# Patient Record
Sex: FEMALE | Race: OTHER | Employment: UNEMPLOYED | URBAN - METROPOLITAN AREA
[De-identification: names, ages, dates, MRNs, and addresses within clinical notes are randomized per-mention and may not be internally consistent; named-entity substitution may affect disease eponyms.]

---

## 2020-04-30 LAB
ANTIBODY SCREEN, EXTERNAL: NEGATIVE
CHLAMYDIA, EXTERNAL: NORMAL
HBSAG, EXTERNAL: NEGATIVE
HIV, EXTERNAL: NEGATIVE
N. GONORRHEA, EXTERNAL: NORMAL
RPR, EXTERNAL: NORMAL
RUBELLA, EXTERNAL: NORMAL
TYPE, ABO & RH, EXTERNAL: NORMAL

## 2020-10-16 LAB — GRBS, EXTERNAL: POSITIVE

## 2020-11-14 ENCOUNTER — HOSPITAL ENCOUNTER (INPATIENT)
Age: 20
LOS: 3 days | Discharge: HOME | DRG: 540 | End: 2020-11-18
Attending: OBSTETRICS & GYNECOLOGY | Admitting: OBSTETRICS & GYNECOLOGY
Payer: MEDICAID

## 2020-11-14 DIAGNOSIS — Z33.1 IUP (INTRAUTERINE PREGNANCY), INCIDENTAL: Primary | ICD-10-CM

## 2020-11-14 PROBLEM — O48.0 41 WEEKS GESTATION OF PREGNANCY: Status: ACTIVE | Noted: 2020-11-14

## 2020-11-14 PROBLEM — F40.01 AGORAPHOBIA WITH PANIC DISORDER: Status: ACTIVE | Noted: 2020-11-14

## 2020-11-14 PROBLEM — Z3A.41 41 WEEKS GESTATION OF PREGNANCY: Status: ACTIVE | Noted: 2020-11-14

## 2020-11-14 PROBLEM — O09.33 LIMITED PRENATAL CARE, THIRD TRIMESTER: Status: ACTIVE | Noted: 2020-11-14

## 2020-11-14 LAB
ABO + RH BLD: NORMAL
APPEARANCE UR: NORMAL
BASOPHILS # BLD: 0 K/UL (ref 0–0.1)
BASOPHILS NFR BLD: 0 % (ref 0–2)
BILIRUB UR QL: NEGATIVE
BLOOD GROUP ANTIBODIES SERPL: NORMAL
COLOR UR: YELLOW
DIFFERENTIAL METHOD BLD: ABNORMAL
EOSINOPHIL # BLD: 0 K/UL (ref 0–0.4)
EOSINOPHIL NFR BLD: 0 % (ref 0–5)
ERYTHROCYTE [DISTWIDTH] IN BLOOD BY AUTOMATED COUNT: 13.8 % (ref 11.6–14.5)
GLUCOSE UR QL STRIP.AUTO: NEGATIVE MG/DL
HCT VFR BLD AUTO: 39.7 % (ref 35–45)
HGB BLD-MCNC: 13.5 G/DL (ref 12–16)
KETONES UR-MCNC: NEGATIVE MG/DL
LEUKOCYTE ESTERASE UR QL STRIP: NEGATIVE
LYMPHOCYTES # BLD: 1.6 K/UL (ref 0.9–3.6)
LYMPHOCYTES NFR BLD: 20 % (ref 21–52)
MCH RBC QN AUTO: 32.1 PG (ref 24–34)
MCHC RBC AUTO-ENTMCNC: 34 G/DL (ref 31–37)
MCV RBC AUTO: 94.3 FL (ref 74–97)
MONOCYTES # BLD: 0.5 K/UL (ref 0.05–1.2)
MONOCYTES NFR BLD: 6 % (ref 3–10)
NEUTS SEG # BLD: 6.1 K/UL (ref 1.8–8)
NEUTS SEG NFR BLD: 74 % (ref 40–73)
NITRITE UR QL: NEGATIVE
PH UR: 7 [PH] (ref 5–9)
PLATELET # BLD AUTO: 153 K/UL (ref 135–420)
PMV BLD AUTO: 11.4 FL (ref 9.2–11.8)
PROT UR QL: NEGATIVE MG/DL
RBC # BLD AUTO: 4.21 M/UL (ref 4.2–5.3)
RBC # UR STRIP: NEGATIVE /UL
SERVICE CMNT-IMP: NORMAL
SP GR UR: 1.01 (ref 1–1.02)
SPECIMEN EXP DATE BLD: NORMAL
UROBILINOGEN UR QL: 0.2 EU/DL (ref 0.2–1)
WBC # BLD AUTO: 8.3 K/UL (ref 4.6–13.2)

## 2020-11-14 PROCEDURE — 74011250637 HC RX REV CODE- 250/637: Performed by: OBSTETRICS & GYNECOLOGY

## 2020-11-14 PROCEDURE — 76815 OB US LIMITED FETUS(S): CPT

## 2020-11-14 PROCEDURE — 85025 COMPLETE CBC W/AUTO DIFF WBC: CPT

## 2020-11-14 PROCEDURE — 81003 URINALYSIS AUTO W/O SCOPE: CPT

## 2020-11-14 PROCEDURE — 86900 BLOOD TYPING SEROLOGIC ABO: CPT

## 2020-11-14 PROCEDURE — 74011250636 HC RX REV CODE- 250/636: Performed by: OBSTETRICS & GYNECOLOGY

## 2020-11-14 PROCEDURE — 99283 EMERGENCY DEPT VISIT LOW MDM: CPT

## 2020-11-14 PROCEDURE — 59025 FETAL NON-STRESS TEST: CPT

## 2020-11-14 PROCEDURE — 74011000258 HC RX REV CODE- 258: Performed by: OBSTETRICS & GYNECOLOGY

## 2020-11-14 PROCEDURE — 74011000250 HC RX REV CODE- 250: Performed by: OBSTETRICS & GYNECOLOGY

## 2020-11-14 PROCEDURE — 65270000029 HC RM PRIVATE

## 2020-11-14 PROCEDURE — 75410000002 HC LABOR FEE PER 1 HR

## 2020-11-14 RX ORDER — MINERAL OIL
30 OIL (ML) ORAL AS NEEDED
Status: DISCONTINUED | OUTPATIENT
Start: 2020-11-14 | End: 2020-11-16

## 2020-11-14 RX ORDER — CLONAZEPAM 0.5 MG/1
0.5 TABLET ORAL DAILY
Status: DISCONTINUED | OUTPATIENT
Start: 2020-11-15 | End: 2020-11-18 | Stop reason: HOSPADM

## 2020-11-14 RX ORDER — MIRTAZAPINE 15 MG/1
45 TABLET, FILM COATED ORAL
Status: DISCONTINUED | OUTPATIENT
Start: 2020-11-14 | End: 2020-11-18 | Stop reason: HOSPADM

## 2020-11-14 RX ORDER — MIRTAZAPINE 45 MG/1
45 TABLET, FILM COATED ORAL
COMMUNITY

## 2020-11-14 RX ORDER — NALBUPHINE HYDROCHLORIDE 10 MG/ML
10 INJECTION, SOLUTION INTRAMUSCULAR; INTRAVENOUS; SUBCUTANEOUS
Status: DISCONTINUED | OUTPATIENT
Start: 2020-11-14 | End: 2020-11-16

## 2020-11-14 RX ORDER — OXYTOCIN/RINGER'S LACTATE 30/500 ML
10 PLASTIC BAG, INJECTION (ML) INTRAVENOUS AS NEEDED
Status: DISCONTINUED | OUTPATIENT
Start: 2020-11-14 | End: 2020-11-16 | Stop reason: HOSPADM

## 2020-11-14 RX ORDER — SODIUM CHLORIDE, SODIUM LACTATE, POTASSIUM CHLORIDE, CALCIUM CHLORIDE 600; 310; 30; 20 MG/100ML; MG/100ML; MG/100ML; MG/100ML
125 INJECTION, SOLUTION INTRAVENOUS CONTINUOUS
Status: DISCONTINUED | OUTPATIENT
Start: 2020-11-14 | End: 2020-11-16

## 2020-11-14 RX ORDER — TERBUTALINE SULFATE 1 MG/ML
0.25 INJECTION SUBCUTANEOUS
Status: DISCONTINUED | OUTPATIENT
Start: 2020-11-14 | End: 2020-11-16

## 2020-11-14 RX ORDER — MISOPROSTOL 100 UG/1
25 TABLET ORAL EVERY 6 HOURS
Status: DISCONTINUED | OUTPATIENT
Start: 2020-11-14 | End: 2020-11-15

## 2020-11-14 RX ORDER — OXYTOCIN/0.9 % SODIUM CHLORIDE 30/500 ML
95 PLASTIC BAG, INJECTION (ML) INTRAVENOUS AS NEEDED
Status: COMPLETED | OUTPATIENT
Start: 2020-11-14 | End: 2020-11-16

## 2020-11-14 RX ORDER — CLONAZEPAM 0.5 MG/1
0.5 TABLET ORAL
Status: COMPLETED | OUTPATIENT
Start: 2020-11-14 | End: 2020-11-14

## 2020-11-14 RX ORDER — BUTORPHANOL TARTRATE 2 MG/ML
2 INJECTION INTRAMUSCULAR; INTRAVENOUS
Status: DISCONTINUED | OUTPATIENT
Start: 2020-11-14 | End: 2020-11-16

## 2020-11-14 RX ORDER — METHYLERGONOVINE MALEATE 0.2 MG/ML
0.2 INJECTION INTRAVENOUS AS NEEDED
Status: DISCONTINUED | OUTPATIENT
Start: 2020-11-14 | End: 2020-11-16 | Stop reason: HOSPADM

## 2020-11-14 RX ORDER — HYDROMORPHONE HYDROCHLORIDE 1 MG/ML
1 INJECTION, SOLUTION INTRAMUSCULAR; INTRAVENOUS; SUBCUTANEOUS
Status: DISCONTINUED | OUTPATIENT
Start: 2020-11-14 | End: 2020-11-16

## 2020-11-14 RX ORDER — QUETIAPINE FUMARATE 100 MG/1
100 TABLET, FILM COATED ORAL
Status: DISCONTINUED | OUTPATIENT
Start: 2020-11-14 | End: 2020-11-18 | Stop reason: HOSPADM

## 2020-11-14 RX ORDER — LIDOCAINE HYDROCHLORIDE 10 MG/ML
20 INJECTION, SOLUTION EPIDURAL; INFILTRATION; INTRACAUDAL; PERINEURAL AS NEEDED
Status: DISCONTINUED | OUTPATIENT
Start: 2020-11-14 | End: 2020-11-16

## 2020-11-14 RX ADMIN — SODIUM CHLORIDE 2.5 MILLION UNITS: 9 INJECTION, SOLUTION INTRAVENOUS at 23:28

## 2020-11-14 RX ADMIN — QUETIAPINE FUMARATE 100 MG: 100 TABLET ORAL at 21:35

## 2020-11-14 RX ADMIN — MISOPROSTOL 25 MCG: 100 TABLET ORAL at 18:09

## 2020-11-14 RX ADMIN — SODIUM CHLORIDE 5 MILLION UNITS: 900 INJECTION INTRAVENOUS at 18:08

## 2020-11-14 RX ADMIN — MIRTAZAPINE 45 MG: 15 TABLET, FILM COATED ORAL at 21:34

## 2020-11-14 RX ADMIN — CLONAZEPAM 0.5 MG: 0.5 TABLET ORAL at 23:09

## 2020-11-14 RX ADMIN — BUTORPHANOL TARTRATE 1 MG: 2 INJECTION, SOLUTION INTRAMUSCULAR; INTRAVENOUS at 23:19

## 2020-11-14 NOTE — PROGRESS NOTES
Dr Kieran Harry has been called and informed cvx is FT/thick/posterior, unable to tell position on baby. Also informed physician pt is GBS positive and has no allergies,GC/Cl are unknown. Dr Summer Haq says he will come in to perform a bedside ultrasound.

## 2020-11-14 NOTE — PROGRESS NOTES
Hortensia Alfred RN has spoken via phone with DR ISAIAS Nicole that this pt is here, hx agoraphobia, anxiety, is here at 41.1 wk gestation, came here for possible IOL. Has received prenatal care from a lay midwife. Orders received to check cervix, then call DR Amina Owen .

## 2020-11-14 NOTE — ROUTINE PROCESS
1415:   41+1 weeks gestation presented to unit via wheelchair via medic. Patient states that she has been seen by a home birth lay midwife, however, a couple weeks ago she discharged her from her care. Patient states she planned to establish care with [facility] house as recommended by her lay midwife, however, she has not yet been seen by them. Patient denies any pain or issues causing her to present today, however, states that she was told by the lay midwife that because she is over 41 weeks gestation, takes certain medications, and had protein in her urine a few weeks ago, she should be induced. Patient taken to [facility], oriented to room and call bell. Provided therapeutic listening and reassurance as patient reports extreme anxiety and fear of panic attack due to agoraphobia. Per lay midwife records, lay midwife would provide initial prenatal care, however, lay midwife recommended patient establish care with an OB/Gyn at approximately 20 weeks gestation as she was not a candidate for a home birth due to medications she is taking as prescribed by her psychiatrist and possible  withdraw after delivery. Patient states she has not been seen or established care with an OB practice at this time, but has called [facility] and was told that she would be seen by the on call provider. Patient states she has never had an ultrasound during her pregnancy. 1525:  Telephone SBAR report given to Dr. Anh Montoya including patient's history, presenting with no complaints, but requesting induction of labor due to concern regarding post due date. Order received to check cervical dilation and then discuss with patient options for continuing care with him and his practice and beginning an induction today or discharge home and scheduling an induction with an OB practice of her choice in the next couple days.   Discussed this with patient and patient's family, patient verbalizes understanding and states that she would like to obtain care from Dr. Abdulkadir Antonio and his OB practice and would like him to begin induction of labor today. 1530:  Bedside and Verbal shift change report given to Krissy Hill RN (oncoming nurse) by Jere Murphy RN 
 (offgoing nurse). Report included the following information SBAR, MAR and Recent Results. Alarm parameters reviewed and opportunities for questions provided.

## 2020-11-14 NOTE — H&P
History & Physical    Name: Anastasia Rodriguez MRN: [id number]  SSN: [id number]    YOB: 2000  Age: 23 y.o. Sex: female      Subjective:     Estimated Date of Delivery: 20  OB History    Para Term  AB Living   1             SAB TAB Ectopic Molar Multiple Live Births                    # Outcome Date GA Lbr Aram/2nd Weight Sex Delivery Anes PTL Lv   1 Current                Ms. Venu Doherty is admitted with pregnancy at 41w1d for induction of labor due to post dates. Prenatal course was complicated by no obstetrical ultrasound, mulitple psych medications including benzodiazepine (clonazepam). Pt reports severe anxiety and agoraphobia. She is also GBS+. .  Please see prenatal records for details. Past Medical History:   Diagnosis Date    Anxiety     Panic     Psychiatric problem     agoraphobia with panic disorder     No past surgical history on file. Social History     Occupational History    Not on file   Tobacco Use    Smoking status: Never Smoker    Smokeless tobacco: Never Used   Substance and Sexual Activity    Alcohol use: Not Currently    Drug use: Never    Sexual activity: Yes     Partners: Male     No family history on file. No Known Allergies  Prior to Admission medications    Medication Sig Start Date End Date Taking? Authorizing Provider   mirtazapine (REMERON) 45 mg tablet Take 45 mg by mouth nightly. Indications: major depressive disorder, depression and anxiety   Yes Provider, Historical   QUEtiapine (SEROQUEL) 50 mg tablet Take 100 mg by mouth nightly. Indications: repeated episodes of anxiety, agoraphobia with panic disorder    OtherSerafin MD   clonazePAM (KLONOPIN) 0.5 mg tablet Take 0.5 mg by mouth nightly as needed. Serafin Sahni MD   citalopram (CELEXA) 10 mg tablet Take 10 mg by mouth daily.     Serafin Sahni MD        Review of Systems: A comprehensive review of systems was negative except for that written in the History of Present Illness. Objective:     Vitals:  Vitals:    11/14/20 1445 11/14/20 1500 11/14/20 1545 11/14/20 1600   BP: 130/79 135/76 139/88 136/83   Pulse: (!) 108 95 (!) 102 91   Resp:  18     Temp:  98.2 °F (36.8 °C)     SpO2:       Weight:  68 kg (150 lb)     Height:  5' 4\" (1.626 m)          Physical Exam:  Patient without distress. Abdomen: soft, nontender  Fundus: soft and non tender  Perineum: blood absent, amniotic fluid absent  Cervical Exam: 1(FT)/(thick)/(posterior)/   Lower Extremities:  - Edema No   - No evidence of DVT seen on physical exam.   - Patellar Reflexes: 1+ bilaterally   - Clonus: absent  Membranes:  Intact  Fetal Heart Rate: Reactive  Baseline: 140 per minute  Variability: moderate  Accelerations: yes  Decelerations: none  Uterine contractions: irregular, every 5-10 minutes          Bedside ultrasound: Viable soni intrauterine pregnancy, vertex presentation, posterior fundal placenta. Adequate appearing amniotic fluid. Prenatal Labs:   Lab Results   Component Value Date/Time    Rubella, External NON immune 04/30/2020    HBsAg, External negative 04/30/2020    HIV, External negative 04/30/2020    RPR, External non-reactive 04/30/2020    Gonorrhea, External no result / no collected 04/30/2020    Chlamydia, External no result / not collected 04/30/2020    ABO,Rh A positive 04/30/2020    GrBStrep, External positive 10/16/2020       Impression/Plan:     Active Problems:    Agoraphobia with panic disorder (11/14/2020)      IUP (intrauterine pregnancy), incidental (11/14/2020)      41 weeks gestation of pregnancy (11/14/2020)      Limited prenatal care, third trimester (11/14/2020)     Benzodiazepine, anti-depressant and antipsychotic use throughout pregnancy    Plan: Admit for induction of labor. Group B Strep positive, will treat prophylactically with penicillin. Will continue her psych meds to avoid withdrawal symptoms as patient reports taking Clonazepam daily and \"jitters/withdrawal\" if she does not. Will notify pediatrics of patient's medication list. Patient advised that bedside ultrasound at this point is simply to confirm vertex position and placental location and not for anatomic survey due to late gestation and equipment available. Plan of care (cervical ripening overnight followed by pitocin when favorable) reviewed with patient and family and all questions answered.

## 2020-11-15 ENCOUNTER — ANESTHESIA EVENT (OUTPATIENT)
Dept: LABOR AND DELIVERY | Age: 20
DRG: 540 | End: 2020-11-15
Payer: MEDICAID

## 2020-11-15 ENCOUNTER — ANESTHESIA (OUTPATIENT)
Dept: LABOR AND DELIVERY | Age: 20
DRG: 540 | End: 2020-11-15
Payer: MEDICAID

## 2020-11-15 PROBLEM — F41.9 ANXIETY DISORDER AFFECTING PREGNANCY, ANTEPARTUM: Status: ACTIVE | Noted: 2020-11-15

## 2020-11-15 PROBLEM — O09.33 LIMITED PRENATAL CARE IN THIRD TRIMESTER: Status: ACTIVE | Noted: 2020-11-15

## 2020-11-15 PROBLEM — F40.01 AGORAPHOBIA WITH PANIC ATTACKS: Status: ACTIVE | Noted: 2020-11-15

## 2020-11-15 PROBLEM — O48.0 POST-DATES PREGNANCY: Status: ACTIVE | Noted: 2020-11-15

## 2020-11-15 PROBLEM — O99.340 ANXIETY DISORDER AFFECTING PREGNANCY, ANTEPARTUM: Status: ACTIVE | Noted: 2020-11-15

## 2020-11-15 PROCEDURE — 77030006643: Performed by: ANESTHESIOLOGY

## 2020-11-15 PROCEDURE — 4A1HXCZ MONITORING OF PRODUCTS OF CONCEPTION, CARDIAC RATE, EXTERNAL APPROACH: ICD-10-PCS | Performed by: OBSTETRICS & GYNECOLOGY

## 2020-11-15 PROCEDURE — 76060000078 HC EPIDURAL ANESTHESIA

## 2020-11-15 PROCEDURE — 74011250636 HC RX REV CODE- 250/636: Performed by: ANESTHESIOLOGY

## 2020-11-15 PROCEDURE — 74011250636 HC RX REV CODE- 250/636: Performed by: OBSTETRICS & GYNECOLOGY

## 2020-11-15 PROCEDURE — 77030008477 HC STYL SATN SLP COVD -A: Performed by: ANESTHESIOLOGY

## 2020-11-15 PROCEDURE — 3E0P7VZ INTRODUCTION OF HORMONE INTO FEMALE REPRODUCTIVE, VIA NATURAL OR ARTIFICIAL OPENING: ICD-10-PCS | Performed by: OBSTETRICS & GYNECOLOGY

## 2020-11-15 PROCEDURE — 77030007879 HC KT SPN EPDRL TELE -B: Performed by: ANESTHESIOLOGY

## 2020-11-15 PROCEDURE — 74011000250 HC RX REV CODE- 250

## 2020-11-15 PROCEDURE — 74011000250 HC RX REV CODE- 250: Performed by: OBSTETRICS & GYNECOLOGY

## 2020-11-15 PROCEDURE — 3E033VJ INTRODUCTION OF OTHER HORMONE INTO PERIPHERAL VEIN, PERCUTANEOUS APPROACH: ICD-10-PCS | Performed by: OBSTETRICS & GYNECOLOGY

## 2020-11-15 PROCEDURE — 77030019905 HC CATH URETH INTMIT MDII -A

## 2020-11-15 PROCEDURE — 74011000258 HC RX REV CODE- 258: Performed by: OBSTETRICS & GYNECOLOGY

## 2020-11-15 PROCEDURE — 74011000250 HC RX REV CODE- 250: Performed by: ANESTHESIOLOGY

## 2020-11-15 PROCEDURE — 77030040830 HC CATH URETH FOL MDII -A

## 2020-11-15 PROCEDURE — 75410000002 HC LABOR FEE PER 1 HR

## 2020-11-15 PROCEDURE — 65270000029 HC RM PRIVATE

## 2020-11-15 PROCEDURE — 77030008683 HC TU ET CUF COVD -A: Performed by: ANESTHESIOLOGY

## 2020-11-15 PROCEDURE — 74011250637 HC RX REV CODE- 250/637: Performed by: OBSTETRICS & GYNECOLOGY

## 2020-11-15 RX ORDER — FENTANYL/ROPIVACAINE/NS/PF 2MCG/ML-.1
PLASTIC BAG, INJECTION (ML) EPIDURAL
Status: COMPLETED
Start: 2020-11-15 | End: 2020-11-15

## 2020-11-15 RX ORDER — FENTANYL CITRATE 50 UG/ML
100 INJECTION, SOLUTION INTRAMUSCULAR; INTRAVENOUS ONCE
Status: DISPENSED | OUTPATIENT
Start: 2020-11-15 | End: 2020-11-15

## 2020-11-15 RX ORDER — FENTANYL CITRATE 50 UG/ML
INJECTION, SOLUTION INTRAMUSCULAR; INTRAVENOUS
Status: COMPLETED
Start: 2020-11-15 | End: 2020-11-15

## 2020-11-15 RX ORDER — SODIUM CHLORIDE 0.9 % (FLUSH) 0.9 %
5-40 SYRINGE (ML) INJECTION AS NEEDED
Status: DISCONTINUED | OUTPATIENT
Start: 2020-11-15 | End: 2020-11-16

## 2020-11-15 RX ORDER — LIDOCAINE HYDROCHLORIDE AND EPINEPHRINE 15; 5 MG/ML; UG/ML
INJECTION, SOLUTION EPIDURAL
Status: SHIPPED | OUTPATIENT
Start: 2020-11-15 | End: 2020-11-15

## 2020-11-15 RX ORDER — OXYTOCIN/RINGER'S LACTATE 30/500 ML
0-20 PLASTIC BAG, INJECTION (ML) INTRAVENOUS
Status: DISCONTINUED | OUTPATIENT
Start: 2020-11-15 | End: 2020-11-16

## 2020-11-15 RX ORDER — LIDOCAINE HYDROCHLORIDE AND EPINEPHRINE 20; 5 MG/ML; UG/ML
INJECTION, SOLUTION EPIDURAL; INFILTRATION; INTRACAUDAL; PERINEURAL AS NEEDED
Status: DISCONTINUED | OUTPATIENT
Start: 2020-11-15 | End: 2020-11-16 | Stop reason: HOSPADM

## 2020-11-15 RX ORDER — DIPHENHYDRAMINE HYDROCHLORIDE 50 MG/ML
25 INJECTION, SOLUTION INTRAMUSCULAR; INTRAVENOUS
Status: DISCONTINUED | OUTPATIENT
Start: 2020-11-15 | End: 2020-11-16

## 2020-11-15 RX ORDER — ONDANSETRON 2 MG/ML
4 INJECTION INTRAMUSCULAR; INTRAVENOUS
Status: DISCONTINUED | OUTPATIENT
Start: 2020-11-15 | End: 2020-11-16

## 2020-11-15 RX ORDER — SODIUM CHLORIDE 0.9 % (FLUSH) 0.9 %
5-40 SYRINGE (ML) INJECTION EVERY 8 HOURS
Status: DISCONTINUED | OUTPATIENT
Start: 2020-11-15 | End: 2020-11-16

## 2020-11-15 RX ORDER — FENTANYL CITRATE 50 UG/ML
INJECTION, SOLUTION INTRAMUSCULAR; INTRAVENOUS AS NEEDED
Status: DISCONTINUED | OUTPATIENT
Start: 2020-11-15 | End: 2020-11-16 | Stop reason: HOSPADM

## 2020-11-15 RX ORDER — OXYTOCIN/RINGER'S LACTATE 30/500 ML
0-20 PLASTIC BAG, INJECTION (ML) INTRAVENOUS
Status: DISCONTINUED | OUTPATIENT
Start: 2020-11-15 | End: 2020-11-15

## 2020-11-15 RX ORDER — PHENYLEPHRINE HCL IN 0.9% NACL 1 MG/10 ML
80 SYRINGE (ML) INTRAVENOUS AS NEEDED
Status: DISCONTINUED | OUTPATIENT
Start: 2020-11-15 | End: 2020-11-16

## 2020-11-15 RX ORDER — FENTANYL/ROPIVACAINE/NS/PF 2MCG/ML-.1
1-15 PLASTIC BAG, INJECTION (ML) EPIDURAL
Status: DISCONTINUED | OUTPATIENT
Start: 2020-11-15 | End: 2020-11-16

## 2020-11-15 RX ORDER — ROPIVACAINE HYDROCHLORIDE 2 MG/ML
INJECTION, SOLUTION EPIDURAL; INFILTRATION; PERINEURAL
Status: SHIPPED | OUTPATIENT
Start: 2020-11-15 | End: 2020-11-16

## 2020-11-15 RX ORDER — NALOXONE HYDROCHLORIDE 0.4 MG/ML
0.2 INJECTION, SOLUTION INTRAMUSCULAR; INTRAVENOUS; SUBCUTANEOUS AS NEEDED
Status: DISCONTINUED | OUTPATIENT
Start: 2020-11-15 | End: 2020-11-16 | Stop reason: HOSPADM

## 2020-11-15 RX ADMIN — SODIUM CHLORIDE 2.5 MILLION UNITS: 9 INJECTION, SOLUTION INTRAVENOUS at 20:13

## 2020-11-15 RX ADMIN — FENTANYL CITRATE 100 MCG: 50 INJECTION, SOLUTION INTRAMUSCULAR; INTRAVENOUS at 20:43

## 2020-11-15 RX ADMIN — LIDOCAINE HYDROCHLORIDE,EPINEPHRINE BITARTRATE 5 ML: 20; .005 INJECTION, SOLUTION EPIDURAL; INFILTRATION; INTRACAUDAL; PERINEURAL at 20:16

## 2020-11-15 RX ADMIN — LIDOCAINE HYDROCHLORIDE,EPINEPHRINE BITARTRATE 5 ML: 20; .005 INJECTION, SOLUTION EPIDURAL; INFILTRATION; INTRACAUDAL; PERINEURAL at 15:33

## 2020-11-15 RX ADMIN — MISOPROSTOL 25 MCG: 100 TABLET ORAL at 05:54

## 2020-11-15 RX ADMIN — LIDOCAINE HYDROCHLORIDE,EPINEPHRINE BITARTRATE 5 ML: 20; .005 INJECTION, SOLUTION EPIDURAL; INFILTRATION; INTRACAUDAL; PERINEURAL at 18:21

## 2020-11-15 RX ADMIN — Medication 15 ML/HR: at 22:26

## 2020-11-15 RX ADMIN — SODIUM CHLORIDE, SODIUM LACTATE, POTASSIUM CHLORIDE, AND CALCIUM CHLORIDE 125 ML/HR: 600; 310; 30; 20 INJECTION, SOLUTION INTRAVENOUS at 20:15

## 2020-11-15 RX ADMIN — SODIUM CHLORIDE, SODIUM LACTATE, POTASSIUM CHLORIDE, AND CALCIUM CHLORIDE 1000 ML: 600; 310; 30; 20 INJECTION, SOLUTION INTRAVENOUS at 07:45

## 2020-11-15 RX ADMIN — SODIUM CHLORIDE 2.5 MILLION UNITS: 9 INJECTION, SOLUTION INTRAVENOUS at 16:00

## 2020-11-15 RX ADMIN — Medication 2 MILLI-UNITS/MIN: at 12:30

## 2020-11-15 RX ADMIN — SODIUM CHLORIDE, SODIUM LACTATE, POTASSIUM CHLORIDE, AND CALCIUM CHLORIDE 500 ML: 600; 310; 30; 20 INJECTION, SOLUTION INTRAVENOUS at 22:02

## 2020-11-15 RX ADMIN — QUETIAPINE FUMARATE 100 MG: 100 TABLET ORAL at 22:19

## 2020-11-15 RX ADMIN — ONDANSETRON 4 MG: 2 INJECTION INTRAMUSCULAR; INTRAVENOUS at 19:29

## 2020-11-15 RX ADMIN — MIRTAZAPINE 45 MG: 15 TABLET, FILM COATED ORAL at 22:20

## 2020-11-15 RX ADMIN — Medication 10 ML/HR: at 08:57

## 2020-11-15 RX ADMIN — CLONAZEPAM 0.5 MG: 0.5 TABLET ORAL at 14:34

## 2020-11-15 RX ADMIN — SODIUM CHLORIDE 2.5 MILLION UNITS: 9 INJECTION, SOLUTION INTRAVENOUS at 07:33

## 2020-11-15 RX ADMIN — ROPIVACAINE HYDROCHLORIDE 14 MG: 2 INJECTION, SOLUTION EPIDURAL; INFILTRATION; PERINEURAL at 23:13

## 2020-11-15 RX ADMIN — FENTANYL CITRATE 100 MCG: 50 INJECTION, SOLUTION INTRAMUSCULAR; INTRAVENOUS at 08:54

## 2020-11-15 RX ADMIN — SODIUM CHLORIDE 2.5 MILLION UNITS: 9 INJECTION, SOLUTION INTRAVENOUS at 03:24

## 2020-11-15 RX ADMIN — ROPIVACAINE HYDROCHLORIDE 20 MG: 2 INJECTION, SOLUTION EPIDURAL; INFILTRATION; PERINEURAL at 08:51

## 2020-11-15 RX ADMIN — SODIUM CHLORIDE 2.5 MILLION UNITS: 9 INJECTION, SOLUTION INTRAVENOUS at 11:30

## 2020-11-15 RX ADMIN — SODIUM CHLORIDE, SODIUM LACTATE, POTASSIUM CHLORIDE, AND CALCIUM CHLORIDE 125 ML/HR: 600; 310; 30; 20 INJECTION, SOLUTION INTRAVENOUS at 10:21

## 2020-11-15 RX ADMIN — ROPIVACAINE HYDROCHLORIDE 20 MG: 2 INJECTION, SOLUTION EPIDURAL; INFILTRATION; PERINEURAL at 20:53

## 2020-11-15 RX ADMIN — LIDOCAINE HYDROCHLORIDE,EPINEPHRINE BITARTRATE 5 ML: 15; .005 INJECTION, SOLUTION EPIDURAL; INFILTRATION; INTRACAUDAL; PERINEURAL at 08:51

## 2020-11-15 NOTE — ANESTHESIA PROCEDURE NOTES
Epidural Block    Start time: 11/15/2020 8:45 AM  End time: 11/15/2020 8:51 AM  Performed by: Macarena Jackson MD  Authorized by:  Macarena Jackson MD     Pre-Procedure  Indication: labor epidural    Preanesthetic Checklist: patient identified, risks and benefits discussed, anesthesia consent, site marked, patient being monitored, timeout performed and anesthesia consent    Timeout Time: 08:44        Epidural:   Patient position:  Seated  Prep region:  Lumbar  Prep: Patient draped and Chlorhexidine    Location:  L4-5    Needle and Epidural Catheter:   Needle Type:  Tuohy  Needle Gauge:  19 G  Injection Technique:  Loss of resistance using saline  Attempts:  1  Catheter Size:  19 G  Catheter at Skin Depth (cm):  9  Depth in Epidural Space (cm):  5  Events: no blood with aspiration, no cerebrospinal fluid with aspiration, no paresthesia and negative aspiration test    Test Dose:  Negative    Assessment:   Catheter Secured:  Tegaderm and tape  Insertion:  Uncomplicated  Patient tolerance:  Patient tolerated the procedure well with no immediate complications

## 2020-11-15 NOTE — PROGRESS NOTES
Problem: Patient Education: Go to Patient Education Activity  Goal: Patient/Family Education  Outcome: Progressing Towards Goal     Problem: Vaginal Delivery: Day of Deliver-Laboring  Goal: Activity/Safety  Outcome: Progressing Towards Goal  Goal: Diagnostic Test/Procedures  11/14/2020 1945 by Malena Villafuerte RN  Outcome: Progressing Towards Goal  11/14/2020 1942 by Malena Villafuerte RN  Outcome: Progressing Towards Goal  Goal: Discharge Planning  11/14/2020 1945 by Malena Villafuerte RN  Outcome: Progressing Towards Goal  11/14/2020 1942 by Malena Villafuerte RN  Outcome: Progressing Towards Goal  Goal: Medications  11/14/2020 1945 by Malena Villafuerte RN  Outcome: Progressing Towards Goal  11/14/2020 1942 by Malena Villafuerte RN  Outcome: Progressing Towards Goal  Goal: Respiratory  11/14/2020 1945 by Malena Villafuerte RN  Outcome: Progressing Towards Goal  11/14/2020 1942 by Malena Villafuerte RN  Outcome: Progressing Towards Goal  Goal: Treatments/Interventions/Procedures  11/14/2020 1945 by Malena Villafuerte RN  Outcome: Progressing Towards Goal  11/14/2020 1942 by Malena Villafuerte RN  Outcome: Progressing Towards Goal  Goal: *Vital signs within defined limits  11/14/2020 1945 by Malena Villafuerte RN  Outcome: Progressing Towards Goal  11/14/2020 1942 by Malena Villafuerte RN  Outcome: Progressing Towards Goal  Goal: *Labs within defined limits  11/14/2020 1945 by Malena Villafuerte RN  Outcome: Progressing Towards Goal  11/14/2020 1942 by Malena Villafuerte RN  Outcome: Progressing Towards Goal  Goal: *Hemodynamically stable  11/14/2020 1945 by Malena Villafuerte RN  Outcome: Progressing Towards Goal  11/14/2020 1942 by Malena Villafuerte RN  Outcome: Progressing Towards Goal  Goal: *Optimal pain control at patient's stated goal  11/14/2020 1945 by Malena Villafuerte RN  Outcome: Progressing Towards Goal  11/14/2020 1942 by Malena Villafuerte RN  Outcome: Progressing Towards Goal     Problem: Vaginal Delivery: Day of Delivery-Post delivery  Goal: Nutrition/Diet  Outcome: Progressing Towards Goal

## 2020-11-15 NOTE — PROGRESS NOTES
Problem: Patient Education: Go to Patient Education Activity  Goal: Patient/Family Education  Outcome: Progressing Towards Goal     Problem: Vaginal Delivery: Day of Deliver-Laboring  Goal: Activity/Safety  Outcome: Progressing Towards Goal  Goal: Diagnostic Test/Procedures  Outcome: Progressing Towards Goal  Goal: Discharge Planning  Outcome: Progressing Towards Goal  Goal: Medications  Outcome: Progressing Towards Goal  Goal: Respiratory  Outcome: Progressing Towards Goal  Goal: Treatments/Interventions/Procedures  Outcome: Progressing Towards Goal  Goal: *Vital signs within defined limits  Outcome: Progressing Towards Goal  Goal: *Labs within defined limits  Outcome: Progressing Towards Goal  Goal: *Hemodynamically stable  Outcome: Progressing Towards Goal  Goal: *Optimal pain control at patient's stated goal  Outcome: Progressing Towards Goal     Problem: Vaginal Delivery: Day of Delivery-Post delivery  Goal: Nutrition/Diet  Outcome: Progressing Towards Goal     Problem: Anxiety  Goal: *Alleviation of anxiety  Description: Pt has hx  anxiety & Agoraphobia, and is on meds for it. Her current meds are:seroquel and remeron, which she has been given here . Has good support system -her mother and FOB are staying overnight w/her. Staff has sat with pt for long periods of time,encouraging pt to ask any questions she may have and providing info on what to expect in labor process- all pt and family questions have been answered. Pt states she does feel some anxiety being here in this new/hospital environment.   Outcome: Progressing Towards Goal     Problem: Pain  Goal: *Control of Pain  Outcome: Progressing Towards Goal

## 2020-11-15 NOTE — PROGRESS NOTES
Pt c/o feeling anxiety, pt states she feels like she is starting to have a panic attack, feels like she can feel her pulse getting rapid, eyes appear fearful, pt jittery, trembling, skin blotchy-FOB at pt's side, talking quietly with her, reassuring her, telling her it is going to be okay, and gently stroking her shoulder. Preheated blankets applied, staff remaining w/pt. Pt says when she gets like this , she takes another klonipin.

## 2020-11-15 NOTE — PROGRESS NOTES
Pt has hx anxiety and agoraphobia. Pt has remained calm while here. She has asked may questions about L & D plan of care and process. This nurse has sat with pt and family for many periods of time- encouraging their questions and providing them with information. All questions have been answered. Pt appears settled in here, calm. Has good support system of her mother and FOB

## 2020-11-15 NOTE — PROGRESS NOTES
8600 Bedside and Verbal shift change report given to Guerrero Wright RN (oncoming nurse) by LEVI Pineda RN (offgoing nurse). Report included the following information SBAR, Kardex, Intake/Output and MAR.     [id number] Patient requesting epidural.    [id number] Dr. Luciana Gramajo paged with prompt response. Informed patient is ready for epidural.     3360 Dr. Luciana Gramajo at bedside explaining epidural procedure, side effects, risks, benefits, and positioning. Patient verbalizes understanding. Time-out: [id number]  Procedure [id number]:  Catheter in, needle out: 0851  Test dose: 0851  Fentanyl vial handed to MD at bedside. 0854  Loading dose: 4234  Patient connected to pump: 0340    2821 Patient assisted to semifowlers. 3328 Dr. Lauren Farrell called unit. MD updated. Will update with next SVE.     0373 Patient resting, denies N/V, dizziness. Would like to wait to take scheduled morning Clonazepam in case she gets anxious as epidural gets more dense. 5977 assisted to right lateral with peanut ball. 8049 MD updated on SVE via [employer]. 1018 left lateral with peanut ball. Declines urge to void. 1123 Patient has no urge to void-Bedpan offered, Bladder distended upon palpation. Options discussed with patient and patient prefers straight cath at this time. 1128 pericare performed. Pads changed. Patient assisted to high fowlers. 1213 Patient turned to right lateral with peanut ball. 1313 patient turned to left lateral with peanut ball. US and TOCO adjusted. 1356 Patient assisted to knee/chest.     1421 Patient assisted to high fowlers. US and TOCO adjusted. 1509 Patient turned to left lateral with right leg over in [facility]. US and TOCO adjusted. [phone] paged with prompt response. Epidural bolus requested. Don 21 at bedside. 1622 Patient turned to right lateral with peanut ball. US and TOCO adjusted. 1650 Patient assisted to knee/chest    1710 head of bed elevated, Hands/knees.  60 Fernando Harrington, [address] adjusted. [facility] Dr. Lauren Farrell Paged via perfect serve. Prompt return call. MD updated on patient status and assessment. IUPC requested as patient is now interested in . Plan to continue monitoring labor progress and continue with Oxytocin IOL. 10289 [facility] paged for epidural bolus. Prompt return call. CRNA on her way. [id number] CRNA at bedside for epidural bolus.  Bedside and Verbal shift change report given to Luis Burnham RN (oncoming nurse) by Guerrero Wright RN (offgoing nurse). Report included the following information SBAR, Kardex, Intake/Output and MAR. Patient/Caregiver provided printed discharge information.

## 2020-11-15 NOTE — ROUTINE PROCESS
2320 Bedside verbal report received. Care of patient assumed at this time. Awake and alert, reports feeling dizzy after stadol was given. Reassured that this is a normal feeling, encouraged to try to relax with breathing. Family at bedside and very supportive. 0005 Cytotec dose held for now due to contractions every 1-3 minutes. 0600 Cytotec placed per order, pt instructed to remain in bed for the next hour. Understanding verbalized. 0630 Reports leakage of fluid. Underpad wet, nitrazine +. Small amount clear fluid noted 0715 Bedside and Verbal shift change report given to Quiana Hermosillo RN (oncoming nurse) by LEVI Fulton RN (offgoing nurse). Report included the following information SBAR, Kardex, Intake/Output, MAR and Recent Results.

## 2020-11-15 NOTE — PROGRESS NOTES
Pt has been pain free until now. Currently, she rates her pain at a 2 on the pain scale,c/o intermittent period-like abdominal cramping. Her acceptable pain rating is a 5. She declines need for pain med at this time. She has a good support system- Boyd[DANY], and her mother[Olesya]the patient just changed her position from semi-fowlers to sitting on the side of the bed & she is watching a movie/shweta system. She is aware she can have IV pain med when she needs it.

## 2020-11-15 NOTE — PROGRESS NOTES
Pt states she is just starting to feel some period-like cramping. Desires to shower- removed from EFM.

## 2020-11-15 NOTE — PROGRESS NOTES
Dr Sara Wyatt has been called w/ rapid return of phone call- he has been informed of pt's increasing anxiety, and her s/s of anxiety, and informed when pt gets like this , she takes an additional klonipin. Order for 0.5 mg klonipin po now received.

## 2020-11-15 NOTE — PROGRESS NOTES
Bedside report given to MATT SULLIVAN [facility]. Care of pt relinquished at this time.

## 2020-11-15 NOTE — PROGRESS NOTES
Dr Nidia Lundberg explaining plan of care with pt and her family[baby is vtx as confirmed by ultrasound]- plan is cytotec intravaginally q 6 hrs tonight, then IV pitocin tomorrow. Pt and family in agreement with plan.

## 2020-11-15 NOTE — ANESTHESIA PREPROCEDURE EVALUATION
Relevant Problems   No relevant active problems       Anesthetic History   No history of anesthetic complications            Review of Systems / Medical History  Patient summary reviewed, nursing notes reviewed and pertinent labs reviewed    Pulmonary  Within defined limits                 Neuro/Psych         Psychiatric history    Comments: Agoraphobia, anxiety, panic disorder Cardiovascular  Within defined limits                     GI/Hepatic/Renal  Within defined limits              Endo/Other  Within defined limits           Other Findings   Comments: No history of back problems or bleeding disorders           Physical Exam    Airway  Mallampati: II  TM Distance: 4 - 6 cm  Neck ROM: normal range of motion   Mouth opening: Normal     Cardiovascular               Dental  No notable dental hx       Pulmonary                 Abdominal         Other Findings            Anesthetic Plan    ASA: 2  Anesthesia type: epidural and general - backup            Anesthetic plan and risks discussed with: Patient      Discussed risks of epidural including infection, bleeding, nerve injury, hypotension, post dural puncture headache, and block failure. Patient wishes to proceed. Failure to progress. ..convert to c section

## 2020-11-15 NOTE — PROGRESS NOTES
Oriented to rm, use of call light, use of white communication board. Explained hrly rounding, use of pain scale. Provided with yellow non-slip socks, provided with [facility] Medications info sheet- 1st dose IV PCN and intravaginal cytotec explained- pt verbalizes her understanding of and has no further questions.

## 2020-11-16 PROCEDURE — 77030031139 HC SUT VCRL2 J&J -A: Performed by: OBSTETRICS & GYNECOLOGY

## 2020-11-16 PROCEDURE — 75410000003 HC RECOV DEL/VAG/CSECN EA 0.5 HR: Performed by: OBSTETRICS & GYNECOLOGY

## 2020-11-16 PROCEDURE — 74011250636 HC RX REV CODE- 250/636: Performed by: ANESTHESIOLOGY

## 2020-11-16 PROCEDURE — 77030010507 HC ADH SKN DERMBND J&J -B: Performed by: OBSTETRICS & GYNECOLOGY

## 2020-11-16 PROCEDURE — 76010000391 HC C SECN FIRST 1 HR: Performed by: OBSTETRICS & GYNECOLOGY

## 2020-11-16 PROCEDURE — 74011000250 HC RX REV CODE- 250: Performed by: NURSE ANESTHETIST, CERTIFIED REGISTERED

## 2020-11-16 PROCEDURE — 74011250637 HC RX REV CODE- 250/637: Performed by: OBSTETRICS & GYNECOLOGY

## 2020-11-16 PROCEDURE — 75410000002 HC LABOR FEE PER 1 HR

## 2020-11-16 PROCEDURE — 74011250636 HC RX REV CODE- 250/636: Performed by: OBSTETRICS & GYNECOLOGY

## 2020-11-16 PROCEDURE — 77030002933 HC SUT MCRYL J&J -A: Performed by: OBSTETRICS & GYNECOLOGY

## 2020-11-16 PROCEDURE — 77030040361 HC SLV COMPR DVT MDII -B: Performed by: OBSTETRICS & GYNECOLOGY

## 2020-11-16 PROCEDURE — 88307 TISSUE EXAM BY PATHOLOGIST: CPT

## 2020-11-16 PROCEDURE — 74011250636 HC RX REV CODE- 250/636: Performed by: NURSE ANESTHETIST, CERTIFIED REGISTERED

## 2020-11-16 PROCEDURE — 74011000258 HC RX REV CODE- 258: Performed by: OBSTETRICS & GYNECOLOGY

## 2020-11-16 PROCEDURE — 76010000392 HC C SECN EA ADDL 0.5 HR: Performed by: OBSTETRICS & GYNECOLOGY

## 2020-11-16 PROCEDURE — 65270000029 HC RM PRIVATE

## 2020-11-16 PROCEDURE — 2709999900 HC NON-CHARGEABLE SUPPLY: Performed by: OBSTETRICS & GYNECOLOGY

## 2020-11-16 PROCEDURE — 59025 FETAL NON-STRESS TEST: CPT

## 2020-11-16 PROCEDURE — 77030011265 HC ELECTRD BLD HEX COVD -A: Performed by: OBSTETRICS & GYNECOLOGY

## 2020-11-16 PROCEDURE — 75410000003 HC RECOV DEL/VAG/CSECN EA 0.5 HR

## 2020-11-16 PROCEDURE — 76060000033 HC ANESTHESIA 1 TO 1.5 HR: Performed by: OBSTETRICS & GYNECOLOGY

## 2020-11-16 PROCEDURE — 77030040361 HC SLV COMPR DVT MDII -B

## 2020-11-16 RX ORDER — CITALOPRAM 10 MG/1
10 TABLET ORAL DAILY
Status: DISCONTINUED | OUTPATIENT
Start: 2020-11-17 | End: 2020-11-18 | Stop reason: HOSPADM

## 2020-11-16 RX ORDER — OXYTOCIN/0.9 % SODIUM CHLORIDE 30/500 ML
PLASTIC BAG, INJECTION (ML) INTRAVENOUS
Status: DISCONTINUED
Start: 2020-11-16 | End: 2020-11-16 | Stop reason: WASHOUT

## 2020-11-16 RX ORDER — SIMETHICONE 80 MG
80 TABLET,CHEWABLE ORAL
Status: DISCONTINUED | OUTPATIENT
Start: 2020-11-16 | End: 2020-11-18 | Stop reason: HOSPADM

## 2020-11-16 RX ORDER — PROCHLORPERAZINE EDISYLATE 5 MG/ML
5 INJECTION INTRAMUSCULAR; INTRAVENOUS
Status: DISCONTINUED | OUTPATIENT
Start: 2020-11-16 | End: 2020-11-16

## 2020-11-16 RX ORDER — NALOXONE HYDROCHLORIDE 0.4 MG/ML
0.1 INJECTION, SOLUTION INTRAMUSCULAR; INTRAVENOUS; SUBCUTANEOUS AS NEEDED
Status: DISCONTINUED | OUTPATIENT
Start: 2020-11-16 | End: 2020-11-18 | Stop reason: HOSPADM

## 2020-11-16 RX ORDER — FENTANYL CITRATE 50 UG/ML
INJECTION, SOLUTION INTRAMUSCULAR; INTRAVENOUS AS NEEDED
Status: DISCONTINUED | OUTPATIENT
Start: 2020-11-16 | End: 2020-11-16 | Stop reason: HOSPADM

## 2020-11-16 RX ORDER — KETOROLAC TROMETHAMINE 15 MG/ML
INJECTION, SOLUTION INTRAMUSCULAR; INTRAVENOUS AS NEEDED
Status: DISCONTINUED | OUTPATIENT
Start: 2020-11-16 | End: 2020-11-16 | Stop reason: HOSPADM

## 2020-11-16 RX ORDER — OXYTOCIN/0.9 % SODIUM CHLORIDE 30/500 ML
95 PLASTIC BAG, INJECTION (ML) INTRAVENOUS AS NEEDED
Status: DISCONTINUED | OUTPATIENT
Start: 2020-11-16 | End: 2020-11-18 | Stop reason: HOSPADM

## 2020-11-16 RX ORDER — PROMETHAZINE HYDROCHLORIDE 25 MG/ML
25 INJECTION, SOLUTION INTRAMUSCULAR; INTRAVENOUS
Status: DISCONTINUED | OUTPATIENT
Start: 2020-11-16 | End: 2020-11-18 | Stop reason: HOSPADM

## 2020-11-16 RX ORDER — PROPOFOL 10 MG/ML
INJECTION, EMULSION INTRAVENOUS AS NEEDED
Status: DISCONTINUED | OUTPATIENT
Start: 2020-11-16 | End: 2020-11-16 | Stop reason: HOSPADM

## 2020-11-16 RX ORDER — OXYTOCIN/RINGER'S LACTATE 30/500 ML
10 PLASTIC BAG, INJECTION (ML) INTRAVENOUS AS NEEDED
Status: DISCONTINUED | OUTPATIENT
Start: 2020-11-16 | End: 2020-11-18 | Stop reason: HOSPADM

## 2020-11-16 RX ORDER — OXYCODONE HYDROCHLORIDE 5 MG/1
5 TABLET ORAL
Status: DISCONTINUED | OUTPATIENT
Start: 2020-11-16 | End: 2020-11-18 | Stop reason: HOSPADM

## 2020-11-16 RX ORDER — MIDAZOLAM HYDROCHLORIDE 1 MG/ML
INJECTION, SOLUTION INTRAMUSCULAR; INTRAVENOUS AS NEEDED
Status: DISCONTINUED | OUTPATIENT
Start: 2020-11-16 | End: 2020-11-16 | Stop reason: HOSPADM

## 2020-11-16 RX ORDER — CEFAZOLIN SODIUM/WATER 2 G/20 ML
SYRINGE (ML) INTRAVENOUS
Status: COMPLETED
Start: 2020-11-16 | End: 2020-11-16

## 2020-11-16 RX ORDER — FENTANYL CITRATE 50 UG/ML
25 INJECTION, SOLUTION INTRAMUSCULAR; INTRAVENOUS AS NEEDED
Status: DISCONTINUED | OUTPATIENT
Start: 2020-11-16 | End: 2020-11-18 | Stop reason: HOSPADM

## 2020-11-16 RX ORDER — SODIUM CHLORIDE 0.9 % (FLUSH) 0.9 %
5-40 SYRINGE (ML) INJECTION AS NEEDED
Status: DISCONTINUED | OUTPATIENT
Start: 2020-11-16 | End: 2020-11-16

## 2020-11-16 RX ORDER — CEFAZOLIN SODIUM/WATER 2 G/20 ML
2 SYRINGE (ML) INTRAVENOUS ONCE
Status: COMPLETED | OUTPATIENT
Start: 2020-11-16 | End: 2020-11-16

## 2020-11-16 RX ORDER — SODIUM CHLORIDE 0.9 % (FLUSH) 0.9 %
5-40 SYRINGE (ML) INJECTION EVERY 8 HOURS
Status: DISCONTINUED | OUTPATIENT
Start: 2020-11-16 | End: 2020-11-18 | Stop reason: HOSPADM

## 2020-11-16 RX ORDER — SODIUM CHLORIDE 0.9 % (FLUSH) 0.9 %
5-40 SYRINGE (ML) INJECTION AS NEEDED
Status: DISCONTINUED | OUTPATIENT
Start: 2020-11-16 | End: 2020-11-18 | Stop reason: HOSPADM

## 2020-11-16 RX ORDER — OXYCODONE HYDROCHLORIDE 5 MG/1
10 TABLET ORAL
Status: DISCONTINUED | OUTPATIENT
Start: 2020-11-16 | End: 2020-11-18 | Stop reason: HOSPADM

## 2020-11-16 RX ORDER — DIPHENHYDRAMINE HYDROCHLORIDE 50 MG/ML
12.5 INJECTION, SOLUTION INTRAMUSCULAR; INTRAVENOUS
Status: DISCONTINUED | OUTPATIENT
Start: 2020-11-16 | End: 2020-11-18 | Stop reason: HOSPADM

## 2020-11-16 RX ORDER — KETOROLAC TROMETHAMINE 30 MG/ML
30 INJECTION, SOLUTION INTRAMUSCULAR; INTRAVENOUS EVERY 6 HOURS
Status: DISCONTINUED | OUTPATIENT
Start: 2020-11-16 | End: 2020-11-17

## 2020-11-16 RX ORDER — SUCCINYLCHOLINE CHLORIDE 100 MG/5ML
SYRINGE (ML) INTRAVENOUS AS NEEDED
Status: DISCONTINUED | OUTPATIENT
Start: 2020-11-16 | End: 2020-11-16 | Stop reason: HOSPADM

## 2020-11-16 RX ORDER — SODIUM CHLORIDE 0.9 % (FLUSH) 0.9 %
5-40 SYRINGE (ML) INJECTION EVERY 8 HOURS
Status: DISCONTINUED | OUTPATIENT
Start: 2020-11-16 | End: 2020-11-16

## 2020-11-16 RX ORDER — FENTANYL CITRATE 50 UG/ML
INJECTION, SOLUTION INTRAMUSCULAR; INTRAVENOUS
Status: COMPLETED
Start: 2020-11-16 | End: 2020-11-16

## 2020-11-16 RX ORDER — KETAMINE HCL IN 0.9 % NACL 50 MG/5 ML
SYRINGE (ML) INTRAVENOUS AS NEEDED
Status: DISCONTINUED | OUTPATIENT
Start: 2020-11-16 | End: 2020-11-16 | Stop reason: HOSPADM

## 2020-11-16 RX ORDER — ONDANSETRON 2 MG/ML
INJECTION INTRAMUSCULAR; INTRAVENOUS AS NEEDED
Status: DISCONTINUED | OUTPATIENT
Start: 2020-11-16 | End: 2020-11-16 | Stop reason: HOSPADM

## 2020-11-16 RX ORDER — IBUPROFEN 400 MG/1
800 TABLET ORAL
Status: DISCONTINUED | OUTPATIENT
Start: 2020-11-19 | End: 2020-11-17

## 2020-11-16 RX ORDER — ZOLPIDEM TARTRATE 5 MG/1
5 TABLET ORAL
Status: DISCONTINUED | OUTPATIENT
Start: 2020-11-16 | End: 2020-11-18 | Stop reason: HOSPADM

## 2020-11-16 RX ORDER — ONDANSETRON 2 MG/ML
4 INJECTION INTRAMUSCULAR; INTRAVENOUS
Status: DISCONTINUED | OUTPATIENT
Start: 2020-11-16 | End: 2020-11-18 | Stop reason: HOSPADM

## 2020-11-16 RX ORDER — FACIAL-BODY WIPES
10 EACH TOPICAL
Status: DISCONTINUED | OUTPATIENT
Start: 2020-11-16 | End: 2020-11-18 | Stop reason: HOSPADM

## 2020-11-16 RX ORDER — SODIUM CHLORIDE, SODIUM LACTATE, POTASSIUM CHLORIDE, CALCIUM CHLORIDE 600; 310; 30; 20 MG/100ML; MG/100ML; MG/100ML; MG/100ML
125 INJECTION, SOLUTION INTRAVENOUS CONTINUOUS
Status: DISPENSED | OUTPATIENT
Start: 2020-11-16 | End: 2020-11-17

## 2020-11-16 RX ORDER — HYDROMORPHONE HYDROCHLORIDE 2 MG/ML
0.5 INJECTION, SOLUTION INTRAMUSCULAR; INTRAVENOUS; SUBCUTANEOUS
Status: DISCONTINUED | OUTPATIENT
Start: 2020-11-16 | End: 2020-11-18 | Stop reason: HOSPADM

## 2020-11-16 RX ORDER — SODIUM CHLORIDE, SODIUM LACTATE, POTASSIUM CHLORIDE, CALCIUM CHLORIDE 600; 310; 30; 20 MG/100ML; MG/100ML; MG/100ML; MG/100ML
125 INJECTION, SOLUTION INTRAVENOUS CONTINUOUS
Status: DISCONTINUED | OUTPATIENT
Start: 2020-11-16 | End: 2020-11-16

## 2020-11-16 RX ORDER — FLUMAZENIL 0.1 MG/ML
0.2 INJECTION INTRAVENOUS
Status: DISCONTINUED | OUTPATIENT
Start: 2020-11-16 | End: 2020-11-18 | Stop reason: HOSPADM

## 2020-11-16 RX ORDER — OXYTOCIN/RINGER'S LACTATE 30/500 ML
PLASTIC BAG, INJECTION (ML) INTRAVENOUS
Status: DISCONTINUED | OUTPATIENT
Start: 2020-11-16 | End: 2020-11-16 | Stop reason: HOSPADM

## 2020-11-16 RX ORDER — ACETAMINOPHEN 500 MG
1000 TABLET ORAL EVERY 6 HOURS
Status: DISCONTINUED | OUTPATIENT
Start: 2020-11-16 | End: 2020-11-18 | Stop reason: HOSPADM

## 2020-11-16 RX ORDER — LIDOCAINE HYDROCHLORIDE 20 MG/ML
INJECTION, SOLUTION EPIDURAL; INFILTRATION; INTRACAUDAL; PERINEURAL AS NEEDED
Status: DISCONTINUED | OUTPATIENT
Start: 2020-11-16 | End: 2020-11-16 | Stop reason: HOSPADM

## 2020-11-16 RX ORDER — OXYTOCIN/0.9 % SODIUM CHLORIDE 30/500 ML
PLASTIC BAG, INJECTION (ML) INTRAVENOUS
Status: DISPENSED
Start: 2020-11-16 | End: 2020-11-16

## 2020-11-16 RX ADMIN — KETOROLAC TROMETHAMINE 30 MG: 15 INJECTION, SOLUTION INTRAMUSCULAR; INTRAVENOUS at 02:56

## 2020-11-16 RX ADMIN — SODIUM CHLORIDE, SODIUM LACTATE, POTASSIUM CHLORIDE, AND CALCIUM CHLORIDE 300 ML: 600; 310; 30; 20 INJECTION, SOLUTION INTRAVENOUS at 01:00

## 2020-11-16 RX ADMIN — Medication 20 MG: at 02:39

## 2020-11-16 RX ADMIN — Medication 2 G: at 02:25

## 2020-11-16 RX ADMIN — FENTANYL CITRATE 100 MCG: 50 INJECTION, SOLUTION INTRAMUSCULAR; INTRAVENOUS at 02:55

## 2020-11-16 RX ADMIN — ROPIVACAINE HYDROCHLORIDE 20 MG: 2 INJECTION, SOLUTION EPIDURAL; INFILTRATION; PERINEURAL at 00:03

## 2020-11-16 RX ADMIN — LIDOCAINE HYDROCHLORIDE,EPINEPHRINE BITARTRATE 5 ML: 20; .005 INJECTION, SOLUTION EPIDURAL; INFILTRATION; INTRACAUDAL; PERINEURAL at 02:15

## 2020-11-16 RX ADMIN — SODIUM CHLORIDE, SODIUM LACTATE, POTASSIUM CHLORIDE, AND CALCIUM CHLORIDE: 600; 310; 30; 20 INJECTION, SOLUTION INTRAVENOUS at 02:26

## 2020-11-16 RX ADMIN — KETOROLAC TROMETHAMINE 30 MG: 30 INJECTION, SOLUTION INTRAMUSCULAR at 18:02

## 2020-11-16 RX ADMIN — FENTANYL CITRATE 100 MCG: 50 INJECTION, SOLUTION INTRAMUSCULAR; INTRAVENOUS at 00:47

## 2020-11-16 RX ADMIN — QUETIAPINE FUMARATE 100 MG: 100 TABLET ORAL at 22:03

## 2020-11-16 RX ADMIN — CLONAZEPAM 0.5 MG: 0.5 TABLET ORAL at 10:23

## 2020-11-16 RX ADMIN — LIDOCAINE HYDROCHLORIDE,EPINEPHRINE BITARTRATE 5 ML: 20; .005 INJECTION, SOLUTION EPIDURAL; INFILTRATION; INTRACAUDAL; PERINEURAL at 02:29

## 2020-11-16 RX ADMIN — LIDOCAINE HYDROCHLORIDE,EPINEPHRINE BITARTRATE 5 ML: 20; .005 INJECTION, SOLUTION EPIDURAL; INFILTRATION; INTRACAUDAL; PERINEURAL at 02:13

## 2020-11-16 RX ADMIN — Medication 500 ML/HR: at 02:48

## 2020-11-16 RX ADMIN — SODIUM CHLORIDE 2.5 MILLION UNITS: 9 INJECTION, SOLUTION INTRAVENOUS at 00:20

## 2020-11-16 RX ADMIN — KETOROLAC TROMETHAMINE 30 MG: 30 INJECTION, SOLUTION INTRAMUSCULAR at 12:11

## 2020-11-16 RX ADMIN — ACETAMINOPHEN 1000 MG: 500 TABLET ORAL at 10:21

## 2020-11-16 RX ADMIN — PROPOFOL 20 MG: 10 INJECTION, EMULSION INTRAVENOUS at 02:38

## 2020-11-16 RX ADMIN — SODIUM CHLORIDE, SODIUM LACTATE, POTASSIUM CHLORIDE, AND CALCIUM CHLORIDE 125 ML/HR: 600; 310; 30; 20 INJECTION, SOLUTION INTRAVENOUS at 18:06

## 2020-11-16 RX ADMIN — Medication 95 MILLI-UNITS/MIN: at 04:09

## 2020-11-16 RX ADMIN — Medication 100 MG: at 02:43

## 2020-11-16 RX ADMIN — SIMETHICONE 80 MG: 80 TABLET, CHEWABLE ORAL at 15:22

## 2020-11-16 RX ADMIN — Medication 30 MG: at 02:52

## 2020-11-16 RX ADMIN — ACETAMINOPHEN 1000 MG: 500 TABLET ORAL at 05:58

## 2020-11-16 RX ADMIN — BENZOCAINE AND MENTHOL 1 LOZENGE: 15; 3.6 LOZENGE ORAL at 13:09

## 2020-11-16 RX ADMIN — ACETAMINOPHEN 1000 MG: 500 TABLET ORAL at 22:29

## 2020-11-16 RX ADMIN — KETOROLAC TROMETHAMINE 30 MG: 30 INJECTION, SOLUTION INTRAMUSCULAR at 06:30

## 2020-11-16 RX ADMIN — SODIUM CHLORIDE, SODIUM LACTATE, POTASSIUM CHLORIDE, AND CALCIUM CHLORIDE 125 ML/HR: 600; 310; 30; 20 INJECTION, SOLUTION INTRAVENOUS at 10:13

## 2020-11-16 RX ADMIN — LIDOCAINE HYDROCHLORIDE 60 MG: 20 INJECTION, SOLUTION EPIDURAL; INFILTRATION; INTRACAUDAL; PERINEURAL at 02:42

## 2020-11-16 RX ADMIN — LIDOCAINE HYDROCHLORIDE,EPINEPHRINE BITARTRATE 5 ML: 20; .005 INJECTION, SOLUTION EPIDURAL; INFILTRATION; INTRACAUDAL; PERINEURAL at 02:17

## 2020-11-16 RX ADMIN — ACETAMINOPHEN 1000 MG: 500 TABLET ORAL at 17:15

## 2020-11-16 RX ADMIN — MIDAZOLAM 2 MG: 1 INJECTION INTRAMUSCULAR; INTRAVENOUS at 03:00

## 2020-11-16 RX ADMIN — ONDANSETRON HYDROCHLORIDE 4 MG: 2 INJECTION INTRAMUSCULAR; INTRAVENOUS at 02:46

## 2020-11-16 RX ADMIN — MIRTAZAPINE 45 MG: 15 TABLET, FILM COATED ORAL at 22:01

## 2020-11-16 RX ADMIN — PROPOFOL 180 MG: 10 INJECTION, EMULSION INTRAVENOUS at 02:42

## 2020-11-16 NOTE — PROGRESS NOTES
1910  Bedside and verbal report received by Nadine Henderson, RN, care assumed at this time. 2010  CRNA paged with prompt return call for Epidural bolus. 2231  CRNA paged regarding pt not getting any relief. Rates pain 10/10. She stated she will contact Dr. Valeri Harrison about replacing the epidural.     2040  Dr. Ian Acosta, at bedside for SVE. 2041  CRNA at bedside for epidural bolus. 2049  Dr. Ian Acosta at bedside to place IUPC, pt is now 7-8, 90/-1.    2110  Dr. Shaila Jimenez called to check in on pt. Discussed pt's history with , also informed Dr. Shaila Jimenez of pt refusing blood products in the event of an emergency to save her life. Dr. Shaila Jimenez asked me to inform and educate pt of her rights and decision to refuse the blood products. 2130  At pt bedside to discuss and educate pt's decision regarding no blood products are to be administered in the event to save her life. This RN made it clear that if pt consents to receive blood products, it would only be in the event od an emergency to save her life, and that if she declines to receive blood products that she is stating that she would rather die than to receive blood products to save her life. Pt stated that she is a Alevism, but does not want to die and would like some time to talk it over with her family. This RN stepped away to give family time to come to an informed and sound decision. 26 Pt resends the no blood product consent form. Pt stated that in the event of an emergency, that she would like to receive blood products to save her life. 200  Dr. Shaila Jimenez paged with prompt return call, notified that pt is 9.5 cm. She stated that she will head up to the hospital.    2305 CRNA paged, with prompt return call for epidural bolus. 5  Spoke with nursing supervisor about removing the Blood refusal: No Blood out of the electronic record, dur to pt changing her mind and now wanting to receive blood products.      2355 CRNA paged for epidural bolus.    7350  CRNA at bedside for epidural bolus. 1758  CRNA paged, with prompt return call for bolus. 6035  CRNA at bedside for epidural bolus. 0053 O2 applied via face mask. 0108  O2 off    0113  Pt placed in throne position. 8413  Dr. Susanne Schreiber at bedside for SVE and to discuss POC, Pt's exam is unchanged, pt requesting a , Dr. Susanne Schreiber explained the risk and benefits of a , pt verbalizes understanding. 1087  has been called, will notify anesthesia. 5  Viable male infant delivered via  by Dr. Susanne Schreiber, cord cut and clamped and taken to radiant warmer for assessment by tayla. 0415  Pt transported via hospital bed from main OR PACU to L&D room 6. Complete set of v/s taken, Pt happy to see her baby, pt holding baby. 5464 Bedside shift change report given to LUZ MARINA Rosen RN (oncoming nurse) by Kay Douglass RN (offgoing nurse). Report included the following information SBAR, Kardex, OR Summary, Procedure Summary, Intake/Output, MAR, Accordion, Recent Results and Quality Measures.

## 2020-11-16 NOTE — PROGRESS NOTES
Problem: Patient Education: Go to Patient Education Activity  Goal: Patient/Family Education  Outcome: Progressing Towards Goal     Problem: Vaginal Delivery: Day of Deliver-Laboring  Goal: Activity/Safety  Outcome: Progressing Towards Goal  Goal: Diagnostic Test/Procedures  Outcome: Progressing Towards Goal  Goal: Discharge Planning  Outcome: Progressing Towards Goal  Goal: Medications  Outcome: Progressing Towards Goal  Goal: Respiratory  Outcome: Progressing Towards Goal  Goal: Treatments/Interventions/Procedures  Outcome: Progressing Towards Goal  Goal: *Vital signs within defined limits  Outcome: Progressing Towards Goal  Goal: *Labs within defined limits  Outcome: Progressing Towards Goal  Goal: *Hemodynamically stable  Outcome: Progressing Towards Goal  Goal: *Optimal pain control at patient's stated goal  Outcome: Progressing Towards Goal     Problem: Vaginal Delivery: Day of Delivery-Post delivery  Goal: Nutrition/Diet  Outcome: Progressing Towards Goal     Problem: Anxiety  Goal: *Alleviation of anxiety  Description: Pt has hx  anxiety & Agoraphobia, and is on meds for it. Her current meds are:seroquel and remeron, which she has been given here . Has good support system -her mother and FOB are staying overnight w/her. Staff has sat with pt for long periods of time,encouraging pt to ask any questions she may have and providing info on what to expect in labor process- all pt and family questions have been answered. Pt states she does feel some anxiety being here in this new/hospital environment. Outcome: Progressing Towards Goal     Problem: Pain  Goal: *Control of Pain  Outcome: Progressing Towards Goal     Problem: Falls - Risk of  Goal: *Absence of Falls  Description: Document Cory Fall Risk and appropriate interventions in the flowsheet. Outcome: Progressing Towards Goal  Note: Fall Risk Interventions:     Call bell with in reach, call before getting out of bed.         Medication Interventions: Patient to call before getting OOB, Teach patient to arise slowly    Elimination Interventions: Call light in reach, Patient to call for help with toileting needs              Problem: Patient Education: Go to Patient Education Activity  Goal: Patient/Family Education  Outcome: Progressing Towards Goal     Problem: Pressure Injury - Risk of  Goal: *Prevention of pressure injury  Description: Document Rishi Scale and appropriate interventions in the flowsheet.   Outcome: Progressing Towards Goal  Note: Pressure Injury Interventions:  Sensory Interventions: Keep linens dry and wrinkle-free, Minimize linen layers    Moisture Interventions: Minimize layers    Activity Interventions: (turning patient frequently)         Nutrition Interventions: Document food/fluid/supplement intake                     Problem: Patient Education: Go to Patient Education Activity  Goal: Patient/Family Education  Outcome: Progressing Towards Goal

## 2020-11-16 NOTE — PROGRESS NOTES
Transferred Pt to . L&D, RN at bedside. Dressing CDI. Left pt stable. 11/16/20 0424   Modified Wolf Score   Activity 2   Respiration 2   Circulation 2   Consciousness 2   O2 Saturation 1   Wolf Score 9   Vital Signs   Temp 98.5 °F (36.9 °C)  (Simultaneous filing. User may not have seen previous data.)   Temp Source Oral  (Simultaneous filing.  User may not have seen previous data.)   Pulse (Heart Rate) (!) 113   BP (!) 133/94   Oxygen Therapy   O2 Sat (%) 100 %   O2 Device Nasal cannula   O2 Flow Rate (L/min) 2 l/min

## 2020-11-16 NOTE — LACTATION NOTE
Per mom, would like to \"try\" breastfeeding. After reviewing medications with patient and family, discussed that using multiple medications for anxiety are not safe for breastfeeding due to amounts that can transfer to breastmilk. Family verbalized understanding. Discussed how to dry up milk supply as well. Family verbalized understanding and no questions at this time.

## 2020-11-16 NOTE — PROGRESS NOTES
Post-Operative Day Number 0 Progress Note    Patient doing well approx 5 hours after  delivery without significant complaints. Only reports being tired. Pain controlled on current medication. Almaguer in place.     Vitals:    Patient Vitals for the past 8 hrs:   BP Temp Pulse Resp SpO2   20 0730 121/71 97.2 °F (36.2 °C) (!) 111 18    20 0700 (!) 118/54  88  97 %   20 0655     97 %   20 0650     97 %   20 0645     97 %   20 0640     97 %   20 0635     96 %   20 0630 121/69  95  97 %   20 0625     97 %   20 0620     97 %   20 0615     97 %   20 0610     97 %   20 0605     96 %   20 0600 (!) 140/82  99  98 %   20 0555     97 %   20 0550     98 %   20 0545 134/87 98.5 °F (36.9 °C) (!) 104 16 98 %   20 0540     98 %   20 0535     99 %   20 0530 134/88  (!) 105  99 %   20 0525     99 %   20 0520     100 %   20 0515 (!) 139/95  (!) 107  100 %   20 0510     100 %   20 0505     100 %   20 0500 (!) 135/100  (!) 108  100 %   20 0458     100 %   20 0453     100 %   20 0448     100 %   20 0445 (!) 140/92  (!) 115     20 0443     100 %   20 0438     100 %   20 0436 (!) 148/108  (!) 122     20 0433     100 %   20 0428     100 %   20 0424 (!) 133/94 98.1 °F (36.7 °C) (!) 113 16 100 %   20 0423     99 %   20 0407  97.9 °F (36.6 °C) (!) 129 17 100 %   20 0406   (!) 125 21 100 %   20 0405 (!) 147/89  (!) 122 24 100 %   20 0400 (!) 141/82  (!) 130 20 100 %   20 0358   (!) 136 23 100 %   20 0357   (!) 137 20 100 %   20 0356   (!) 132 19 100 %   20 0355 127/84  (!) 136 15 100 %   20 0354   (!) 139 23 100 %   20 0353   (!) 141 20 100 %   20 0352 (!) 140/75 97 °F (36.1 °C) (!) 141 21 100 %   20 0351   (!) 138 15 100 %   20 0350 (!) 140/75    100 %   20 0349 (!) 142/87       20 0115 (!) 139/95  (!) 115     20 0112     99 %   20 0107     100 %   20 0102     100 %   20 0100 122/70 99.4 °F (37.4 °C) (!) 112 18    20 0057     100 %   20 0052     99 %   20 0047     97 %   20 0042     99 %     Temp (24hrs), Av.7 °F (37.1 °C), Min:97 °F (36.1 °C), Max:99.9 °F (37.7 °C)      Vital signs stable, afebrile. Exam:  Patient without distress. Abdomen soft, fundus firm 2-7WT above umbilicus, non tender. Incision with bandage in place. No drainage noted on bandage. Lower extremities are negative for swelling, cords or tenderness. SCDs in place. Lab/Data Review:  H/H tomorrow AM    Assessment and Plan:    1. Post op - Patient appears to be doing well. Continue routine post-op care and maternal education. 2. Anxiety disorder - Continue home psych medication and encouraged pt and family to discuss meds with pediatrician and lactation consultant if she desires to breastfeed. 3. Limited prenatal care with lay midwife - social work consult.

## 2020-11-16 NOTE — PROGRESS NOTES
1045  TRANSFER - IN REPORT:    Verbal report received from S. Randall Cranker, RN (name) on [place]  being received from Labor and Delivery (unit) for routine progression of care      Report consisted of patients Situation, Background, Assessment and   Recommendations(SBAR). Information from the following report(s) SBAR, Kardex, Intake/Output, MAR and Recent Results was reviewed with the receiving nurse. Opportunity for questions and clarification was provided. Assessment completed upon patients arrival to unit and care assumed. 1100  Completed assessment. No further needs at this time. 1125  Rounded on patient, no further needs at this time. 1215  Gave scheduled toradol. Patient would like throat lozenge. No further needs at this time. [address] to bring throat lozenge. 1255  Rounded on patient and reassessed pain. No further needs at this time. 1310  Gave throat lozenge. No further needs at this time. 1350  Rounded on patient, no further needs at this time. 1450  Emptied Almaguer and removed Almaguer. Assisted patient up to the bathroom and changed bed linens. Patient assisted back to bed. 1515  Completed assessment and VS. Gave PRN mylicon. NO further needs at this time. 1710  Assisted patient to bathroom to void and then back to bed. Gave scheduled tylenol. No further needs at this time. 1750  Rounded on patient, no further needs at this time. 1800  Reassessed pain, gave scheduled toradol, and hung more LR. No further needs at this time. [address] patient to bathroom, no further needs at this time. 1905  Bedside and Verbal shift change report given to SUNDAY Medrano RN (oncoming nurse) by DAVID Briggs RN (offgoing nurse). Report included the following information SBAR, Kardex, Intake/Output, MAR and Recent Results.

## 2020-11-16 NOTE — L&D DELIVERY NOTE
Delivery Summary    Patient: Raimundo Gore MRN: [id number]  SSN: [id number]    YOB: 2000  Age: [address] [place] y.o.   Sex: female        Information for the patient's :  Merlin Rising [[id number]]       Labor Events:    Labor:      Steroids:     Cervical Ripening Date/Time:       Cervical Ripening Type:     Antibiotics During Labor:     Rupture Identifier:      Rupture Date/Time: 11/15/2020 6:30 AM   Rupture Type: SROM   Amniotic Fluid Volume:      Amniotic Fluid Description: Clear    Amniotic Fluid Odor: None    Induction: Prostaglandins       Induction Date/Time:        Indications for Induction: Post-term Gestation    Augmentation:     Augmentation Date/Time:      Indications for Augmentation:     Labor complications: Failure to Progress in Second Stage       Additional complications:        Delivery Events:  Indications For Episiotomy:     Episiotomy:     Perineal Laceration(s):     Repaired:     Periurethral Laceration Location:      Repaired:     Labial Laceration Location:     Repaired:     Sulcal Laceration Location:     Repaired:     Vaginal Laceration Location:     Repaired:     Cervical Laceration Location:     Repaired:     Repair Suture:     Number of Repair Packets:     Estimated Blood Loss (ml):  ml   Quantitaive Blood Loss (ml):             Delivery Date: 2020    Delivery Time: 2:47 AM   Delivery Type: , Low Transverse     Details    Trial of Labor: Yes   Primary/Repeat: Primary   Priority: Emergency   Indications:  Failure to Progress       Sex:  Male     Gestational Age: 45w2d  Delivery Clinician:  Lorenzo Rodgers  Living Status: Living   Delivery Location:              [facility]  One minute Five minutes Ten minutes   Skin color: 0   1        Heart rate: 1   2        Grimace: 1   2        Muscle tone: 1   2        Breathin   2        Totals: 4   9          Presentation: Vertex    Position: Middle Occiput Posterior  Resuscitation Method:        Meconium Stained:        Cord Information: 3 Vessels  Complications: None  Cord around: trunk  Delayed cord clamping? No  Cord clamped date/time:   Disposition of Cord Blood: Lab    Blood Gases Sent?: Yes    Placenta:  Date/Time:    Removal: Spontaneous      Appearance: Normal      Measurements:  Birth Weight: 3.997 kg      Birth Length: 55.2 cm      Head Circumference: 33 cm      Chest Circumference: 32 cm     Abdominal Girth: 30 cm    Other Providers:    , Obstetrician;Primary Nurse;Primary  Nurse;Nicu Nurse;Neonatologist;Anesthesiologist;Crna;Nurse Practitioner;Midwife;Nursery Nurse             Group B Strep:   Lab Results   Component Value Date/Time    GrBStrep, External positive 10/16/2020     Information for the patient's :  Vero Gonzalez [[id number]]   No results found for: ABORH, PCTABR, PCTDIG, BILI, ABORHEXT, ABORH     No results for input(s): PCO2CB, PO2CB, HCO3I, SO2I, IBD, PTEMPI, SPECTI, PHICB, ISITE, IDEV, IALLEN in the last 72 hours. Estimated Blood Loss: 700ml    IV Fluids: 1600ml    Urine Output: 25ml    Procedure Detail:    The patient was taken to the operating room, where epidural anesthesia was bolused. A garcia catheter was in place and a vaginal betadine prep was performed by the physician. The patient was prepped and draped in the normal sterile fashion. Epidural was not adequate anesthesia, so general anesthesia was initiated. Pfannenstiel skin incision was made with the scalpel and carried down to the underlying fascia with the bovie. The fascial incision was extended laterally in a blunt manner. The superior aspect of the fascial incision was grasped with Kocher clamps, tented up, and the underlying rectus muscles were dissected bluntly and sharply. The inferior edge of the rectus fascia was grasped with Kocher clamps, tented up, and the underlying rectus muscle was dissected off bluntly and sharply.  The rectus muscle was divided in the midline bluntly. The peritoneum was entered bluntly. The bladder blade was then inserted. A low transverse uterine incision was made with the scalpel and extended laterally with blunt finger dissection. The babys head was then delivered atraumatically in occiput posterior presentation. After delivery, the nose and mouth were suctioned. The cord was clamped and cut and the baby was handed off to the waiting  care unit staff. Placenta was then delivered spontaneously. The uterus was exteriorized and cleared of all clots and debris. The uterine incision was closed in two layers. The first layer with running locked layer of 0 Monocryl. The second layer was an imbricating layer of 0 Monocryl with good hemostasis assured. The posterior cul-de-sac was washed with warm normal saline. Good hemostasis was again reassured and the uterus was placed back into the abdomen. The paracolic gutters were washed with warm normal saline. Good hemostasis was again reassured throughout. The fascia was closed with 0 Vicryl in a running fashion. Good hemostasis was assured. The subcuticular layers were irrigated and then were reapproximated with 3-0 Monocryl. The skin was closed with 4-0 Monocryl in a subcuticular fashion. The patient tolerated the procedure well. Sponge, lap, and needle counts were correct times two and the patient was taken to recovery in stable condition.

## 2020-11-16 NOTE — PROGRESS NOTES
Labor Progress Note  Patient seen, fetal heart rate and contraction pattern evaluated. Visit Vitals  /77   Pulse (!) 109   Temp 99.7 °F (37.6 °C)   Resp 18   Ht 5' 4\" (1.626 m)   Wt 68 kg (150 lb)   SpO2 (!) 82%   Breastfeeding Yes   BMI 25.75 kg/m²       Physical Exam:  Cervical Exam: 6/90/-1  Membranes:  SROM, clear fluid  Uterine Activity: Frequency: q 1-2 min  Fetal Heart Rate: Reactive  Accelerations: yes  Decelerations: none      Assessment/Plan:  Pt now in active labor and progressing. Will place IUPC and continue pitocin per protocol. Anesthesia to optimize pain control.     Madhavi Singh MD  11/15/2020  8:42 PM

## 2020-11-16 NOTE — PROGRESS NOTES
Labor Progress Note  Patient seen, fetal heart rate and contraction pattern evaluated, patient examined. Patient uncomfortable with contractions. Patient Vitals for the past 1 hrs:   BP Temp Pulse Resp SpO2   20 0115 (!) 139/95  (!) 115     20 0112     99 %   20 0107     100 %   202     100 %   20 0100 122/70 99.4 °F (37.4 °C) (!) 112 18    20 0057     100 %   20 0052     99 %   20 0047     97 %   20 0042     99 %   20 0037     98 %   20 0036     97 %       Physical Exam:  Cervical Exam:  9 cm dilated    90% effaced    0 station    Presenting Part: cephalic    Uterine Activity: contractions q 2min  Fetal Heart Rate: Baseline: 130s per minute  Variability: moderate  Accelerations: yes  Decelerations: had a prolonged decel to 110s vs baseline change for approx 15min that resolved with position change    Assessment/Plan:  1. Labor with failure to progress - No cervical change over the past 2.5hrs. Discussed option of rechecking in another 1-2hrs, but pt very strongly desires  section. She reports that the females in her family all have  sections and she is very worried that the baby will not fit. Discussed risks of  section including increased risk of bleeding and potential need for a blood transfusion, risk of infection, and risk of injury to bowel, bladder, ureters, blood vessels, nerves. The patient and her spouse voiced understanding to these risks and desire to proceed with  section. 2. Anxiety disorder - on citalopram, clonazapam, mirtazapine, and quetiapine for duration of pregnancy. Will continue PP. 3. Limited prenatal care with lay midwife - social work consult PP. 4.  GBS+ on penicillin

## 2020-11-16 NOTE — ANESTHESIA POSTPROCEDURE EVALUATION
Post-Anesthesia Evaluation and Assessment    Cardiovascular Function/Vital Signs  Visit Vitals  BP (!) 147/89   Pulse (!) 129   Temp 36.1 °C (97 °F)   Resp 17   Ht 5' 4\" (1.626 m)   Wt 68 kg (150 lb)   SpO2 100%   Breastfeeding Yes   BMI 25.75 kg/m²       Patient is status post Procedure(s):   SECTION. Nausea/Vomiting: Controlled. Postoperative hydration reviewed and adequate. Pain:  Pain Scale 1: Visual (20)  Pain Intensity 1: 0 (20)   Managed. Neurological Status:   Neuro (WDL): Within Defined Limits (20)   At baseline. Mental Status and Level of Consciousness: Arousable. Pulmonary Status:   O2 Device: Nasal cannula (20)   Adequate oxygenation and airway patent. Complications related to anesthesia: None    Post-anesthesia assessment completed. No concerns. Patient has met all discharge requirements. Signed By: Ramón Fierro CRNA    2020             Procedure(s):   SECTION. epidural, general    <BSHSIANPOST>    INITIAL Post-op Vital signs:   Vitals Value Taken Time   /90 2020  4:10 AM   Temp 36.1 °C (97 °F) 2020  3:52 AM   Pulse 130 2020  4:12 AM   Resp 18 2020  4:12 AM   SpO2 100 % 2020  4:12 AM   Vitals shown include unvalidated device data.

## 2020-11-16 NOTE — PROGRESS NOTES
0725 Bedside and verbal report received form Gregory Parker RN. Family at bedside. 0830 Dr. Kong Escoto at bedside. 3703 TRANSFER - OUT REPORT:    Verbal report given to Chester SEAMAN RN(name) on [place]  being transferred to postpartum(unit) for routine progression of care       Report consisted of patients Situation, Background, Assessment and   Recommendations(SBAR). Information from the following report(s) SBAR, Kardex, Intake/Output and MAR was reviewed with the receiving nurse. Lines:   Peripheral IV 11/14/20 Right Hand (Active)   Site Assessment Clean, dry, & intact 11/16/20 0730   Phlebitis Assessment 0 11/16/20 0730   Infiltration Assessment 0 11/16/20 0730   Dressing Status Clean, dry, & intact 11/16/20 0730   Dressing Type Tape;Transparent 11/16/20 0730   Hub Color/Line Status Pink 11/16/20 0730   Alcohol Cap Used Yes 11/16/20 0730        Opportunity for questions and clarification was provided.       Patient transported with:   Registered Nurse

## 2020-11-16 NOTE — PROGRESS NOTES
Problem: Patient Education: Go to Patient Education Activity  Goal: Patient/Family Education  Outcome: Progressing Towards Goal     Problem: Anxiety  Goal: *Alleviation of anxiety  Description: Pt has hx  anxiety & Agoraphobia, and is on meds for it. Her current meds are:seroquel and remeron, which she has been given here . Has good support system -her mother and FOB are staying overnight w/her. Staff has sat with pt for long periods of time,encouraging pt to ask any questions she may have and providing info on what to expect in labor process- all pt and family questions have been answered. Pt states she does feel some anxiety being here in this new/hospital environment. Outcome: Progressing Towards Goal     Problem: Pain  Goal: *Control of Pain  Outcome: Progressing Towards Goal     Problem: Falls - Risk of  Goal: *Absence of Falls  Description: Document Cory Fall Risk and appropriate interventions in the flowsheet. Outcome: Progressing Towards Goal  Note: Fall Risk Interventions:     Call bell with in reach, call before getting out of bed. Medication Interventions: Patient to call before getting OOB, Teach patient to arise slowly    Elimination Interventions: Call light in reach, Patient to call for help with toileting needs              Problem: Patient Education: Go to Patient Education Activity  Goal: Patient/Family Education  Outcome: Progressing Towards Goal     Problem: Pressure Injury - Risk of  Goal: *Prevention of pressure injury  Description: Document Rishi Scale and appropriate interventions in the flowsheet.   Outcome: Progressing Towards Goal  Note: Pressure Injury Interventions:  Sensory Interventions: Keep linens dry and wrinkle-free, Minimize linen layers    Moisture Interventions: Minimize layers    Activity Interventions: (turning patient frequently)         Nutrition Interventions: Document food/fluid/supplement intake                     Problem: Patient Education: Go to Patient Education Activity  Goal: Patient/Family Education  Outcome: Progressing Towards Goal

## 2020-11-16 NOTE — PROGRESS NOTES
Problem: Anxiety  Goal: *Alleviation of anxiety  Description: Pt has hx  anxiety & Agoraphobia, and is on meds for it. Her current meds are:seroquel and remeron, which she has been given here . Has good support system -her mother and FOB are staying overnight w/her. Staff has sat with pt for long periods of time,encouraging pt to ask any questions she may have and providing info on what to expect in labor process- all pt and family questions have been answered. Pt states she does feel some anxiety being here in this new/hospital environment. Outcome: Progressing Towards Goal     Problem: Pain  Goal: *Control of Pain  Outcome: Progressing Towards Goal     Problem: Falls - Risk of  Goal: *Absence of Falls  Description: Document Cory Fall Risk and appropriate interventions in the flowsheet. Outcome: Progressing Towards Goal  Note: Fall Risk Interventions:            Medication Interventions: Patient to call before getting OOB, Teach patient to arise slowly    Elimination Interventions: Call light in reach, Patient to call for help with toileting needs              Problem: Patient Education: Go to Patient Education Activity  Goal: Patient/Family Education  Outcome: Progressing Towards Goal     Problem: Pressure Injury - Risk of  Goal: *Prevention of pressure injury  Description: Document Rishi Scale and appropriate interventions in the flowsheet.   Outcome: Progressing Towards Goal  Note: Pressure Injury Interventions:  Sensory Interventions: Keep linens dry and wrinkle-free, Minimize linen layers    Moisture Interventions: Absorbent underpads    Activity Interventions: Pressure redistribution bed/mattress(bed type)    Mobility Interventions: Pressure redistribution bed/mattress (bed type)    Nutrition Interventions: Document food/fluid/supplement intake                     Problem: Patient Education: Go to Patient Education Activity  Goal: Patient/Family Education  Outcome: Progressing Towards Goal     Problem: Patient Education: Go to Patient Education Activity  Goal: Patient/Family Education  Outcome: Progressing Towards Goal     Problem:  Delivery: Day of Delivery  Goal: Activity/Safety  Outcome: Progressing Towards Goal  Goal: Consults, if ordered  Outcome: Progressing Towards Goal  Goal: Diagnostic Test/Procedures  Outcome: Progressing Towards Goal  Goal: Nutrition/Diet  Outcome: Progressing Towards Goal  Goal: Medications  Outcome: Progressing Towards Goal  Goal: Respiratory  Outcome: Progressing Towards Goal  Goal: Treatments/Interventions/Procedures  Outcome: Progressing Towards Goal  Goal: Psychosocial  Outcome: Progressing Towards Goal  Goal: *Vital signs within defined limits  Outcome: Progressing Towards Goal  Goal: *Labs within defined limits  Outcome: Progressing Towards Goal  Goal: *Hemodynamically stable  Outcome: Progressing Towards Goal  Goal: *Optimal pain control at patient's stated goal  Outcome: Progressing Towards Goal  Goal: *Participates in infant care  Outcome: Progressing Towards Goal  Goal: *Demonstrates progressive activity  Outcome: Progressing Towards Goal  Goal: *Tolerating diet  Outcome: Progressing Towards Goal

## 2020-11-17 LAB
HCT VFR BLD AUTO: 30.2 % (ref 35–45)
HGB BLD-MCNC: 10.2 G/DL (ref 12–16)

## 2020-11-17 PROCEDURE — 74011250637 HC RX REV CODE- 250/637: Performed by: OBSTETRICS & GYNECOLOGY

## 2020-11-17 PROCEDURE — 74011250636 HC RX REV CODE- 250/636: Performed by: OBSTETRICS & GYNECOLOGY

## 2020-11-17 PROCEDURE — 85018 HEMOGLOBIN: CPT

## 2020-11-17 PROCEDURE — 36415 COLL VENOUS BLD VENIPUNCTURE: CPT

## 2020-11-17 PROCEDURE — 65270000029 HC RM PRIVATE

## 2020-11-17 PROCEDURE — 85014 HEMATOCRIT: CPT

## 2020-11-17 RX ORDER — IBUPROFEN 400 MG/1
800 TABLET ORAL
Status: DISCONTINUED | OUTPATIENT
Start: 2020-11-17 | End: 2020-11-18 | Stop reason: HOSPADM

## 2020-11-17 RX ADMIN — KETOROLAC TROMETHAMINE 30 MG: 30 INJECTION, SOLUTION INTRAMUSCULAR at 06:20

## 2020-11-17 RX ADMIN — MIRTAZAPINE 45 MG: 15 TABLET, FILM COATED ORAL at 23:14

## 2020-11-17 RX ADMIN — ACETAMINOPHEN 1000 MG: 500 TABLET ORAL at 18:09

## 2020-11-17 RX ADMIN — CLONAZEPAM 0.5 MG: 0.5 TABLET ORAL at 09:35

## 2020-11-17 RX ADMIN — QUETIAPINE FUMARATE 100 MG: 100 TABLET ORAL at 23:14

## 2020-11-17 RX ADMIN — BENZOCAINE AND MENTHOL 1 LOZENGE: 15; 3.6 LOZENGE ORAL at 09:41

## 2020-11-17 RX ADMIN — ACETAMINOPHEN 1000 MG: 500 TABLET ORAL at 06:19

## 2020-11-17 RX ADMIN — BENZOCAINE AND MENTHOL 1 LOZENGE: 15; 3.6 LOZENGE ORAL at 15:54

## 2020-11-17 RX ADMIN — IBUPROFEN 800 MG: 400 TABLET ORAL at 15:06

## 2020-11-17 RX ADMIN — ACETAMINOPHEN 1000 MG: 500 TABLET ORAL at 11:09

## 2020-11-17 RX ADMIN — ACETAMINOPHEN 1000 MG: 500 TABLET ORAL at 23:14

## 2020-11-17 RX ADMIN — KETOROLAC TROMETHAMINE 30 MG: 30 INJECTION, SOLUTION INTRAMUSCULAR at 00:03

## 2020-11-17 NOTE — PROGRESS NOTES
1905 Bedside report received from R. [address] [place], RN. Pain rated 3/10. Declines need for pain intervention at this time. Pt. Stable. Needs addressed. Callbell within reach. 1947 Pt assessment completed with Yuni Liu RN. Vital signs stable. Pain rated 3/10. Educated pt on pain medication. Fundus firm at U, scant rubra lochia. Dressing to incision clean, dry and intact. Incentive spirometer complete. Educated pt on plan of care and signs and symptoms to report. Pt then transported to nursery via wheelchair to join baby for bath. 2228 Pt rated pain 8/10. Educated on pain management, and pain medication ordered for pain rating. Pt refuses any pain medication that could cause dizziness because it affects her anxiety negatively. 0326 Rounding complete. Pt resting comfortably. 0720 Bedside and Verbal shift change report given to Kerry Ly RN  (oncoming nurse) by SUNDAY Tsang (offgoing nurse). Report included the following information SBAR, Kardex, Intake/Output, MAR and Recent Results.

## 2020-11-17 NOTE — PROGRESS NOTES
1905 Bedside shift change report given to Cullen Rendon, MICHELLE and SUNDAY Hamlin by [address] [place], RN. Pain rated 3/10. Declines need for pain intervention at this time. Pt stable. Needs addressed and callbell within reach. 1935 Pt rated pain 4/10 with pain goal of 7. U firm, midline with scant rubra lochia. Vital signs stable. 1947 Pt traveled to the nursery for baby's bath. 2020 Pt returned to room. Callbell within reach. 0003 Pt rated pain 7/10. Pain medication administered as ordered.

## 2020-11-17 NOTE — PROGRESS NOTES
Problem: Pain  Goal: *Control of Pain  Outcome: Progressing Towards Goal     Problem:  Delivery: Postpartum Day 1  Goal: Activity/Safety  Outcome: Progressing Towards Goal  Goal: Medications  Outcome: Progressing Towards Goal  Goal: Psychosocial  Outcome: Progressing Towards Goal  Goal: *Vital signs within defined limits  Outcome: Progressing Towards Goal  Goal: *Labs within defined limits  Outcome: Progressing Towards Goal  Goal: *Hemodynamically stable  Outcome: Progressing Towards Goal  Goal: *Participates in infant care  Outcome: Progressing Towards Goal

## 2020-11-17 NOTE — PROGRESS NOTES
Cm consult received for limited prenatal care, cm spoke with pt via phone conversation, explained reason for call to discuss d/c planning and consult, pt stated she started seeing an midwife starting at approx 11 weeks and was referred to OBGYN due to possible preeclampsia, when discharged pt plans to follow up with OBGYN, pt has family support and all infant supplies.

## 2020-11-17 NOTE — PROGRESS NOTES
OB/GYN Progress Note    POD # 1  Assessment:     S/p Primary C/S for FTP  Doing well  Desires circumcision    Plan:   Routine care  The risks and benefits of the circumcision procedure and anesthesia including: bleeding, infection, variability of cosmetic results were discussed at length with the mother, and injury to head of penis. She is aware that future repeat procedures may be necessary. She gives informed consent to proceed as noted and her questions are answered. Continue current care. Subjective: The patient denies complaints. Pain is well controlled with current medications. Urinating without difficulty. The patient is ambulating well. The patient is tolerating a normal diet. decreased Lochia  Pt. is breastfeeding. Pt. has passed flatus. Pt. has not had a BM. Pt. does desire circumcision.     Objective:      Visit Vitals  /75 (BP 1 Location: Right arm, BP Patient Position: At rest;Sitting)   Pulse 88   Temp 97.6 °F (36.4 °C)   Resp 16   Ht 5' 4\" (1.626 m)   Wt 68 kg (150 lb)   SpO2 97%   Breastfeeding Yes   BMI 25.75 kg/m²       Intake/Output Summary (Last 24 hours) at 11/17/2020 1101  Last data filed at 11/16/2020 1840  Gross per 24 hour   Intake    Output 1700 ml   Net -1700 ml         General:    no distress   CVS:   RRR   Lungs: Clear to auscultation   Abdomen:   Soft, moo TTP, +BS   Incsion healing well, no significant drainage, no dehiscence, no significant erythema    Extremities:  No evidence of DVT seen on physical exam.. no edema          Labs:    Recent Results (from the past 24 hour(s))   HEMOGLOBIN    Collection Time: 11/17/20  6:30 AM   Result Value Ref Range    HGB 10.2 (L) 12.0 - 16.0 g/dL   HEMATOCRIT    Collection Time: 11/17/20  6:30 AM   Result Value Ref Range    HCT 30.2 (L) 35.0 - 45.0 %           Cherie Gould MD, MD 11/17/2020 11:01 AM

## 2020-11-18 VITALS
HEART RATE: 68 BPM | TEMPERATURE: 98.2 F | SYSTOLIC BLOOD PRESSURE: 130 MMHG | RESPIRATION RATE: 17 BRPM | BODY MASS INDEX: 25.61 KG/M2 | DIASTOLIC BLOOD PRESSURE: 74 MMHG | WEIGHT: 150 LBS | HEIGHT: 64 IN | OXYGEN SATURATION: 99 %

## 2020-11-18 PROCEDURE — 74011250636 HC RX REV CODE- 250/636: Performed by: OBSTETRICS & GYNECOLOGY

## 2020-11-18 PROCEDURE — 90715 TDAP VACCINE 7 YRS/> IM: CPT | Performed by: OBSTETRICS & GYNECOLOGY

## 2020-11-18 PROCEDURE — 74011250637 HC RX REV CODE- 250/637: Performed by: OBSTETRICS & GYNECOLOGY

## 2020-11-18 PROCEDURE — 90707 MMR VACCINE SC: CPT | Performed by: OBSTETRICS & GYNECOLOGY

## 2020-11-18 PROCEDURE — 77030036554

## 2020-11-18 RX ORDER — HYDROCODONE BITARTRATE AND ACETAMINOPHEN 5; 325 MG/1; MG/1
1 TABLET ORAL
Qty: 20 TAB | Refills: 0 | Status: SHIPPED | OUTPATIENT
Start: 2020-11-18 | End: 2020-11-23

## 2020-11-18 RX ADMIN — ACETAMINOPHEN 1000 MG: 500 TABLET ORAL at 11:22

## 2020-11-18 RX ADMIN — TETANUS TOXOID, REDUCED DIPHTHERIA TOXOID AND ACELLULAR PERTUSSIS VACCINE, ADSORBED 0.5 ML: 5; 2.5; 8; 8; 2.5 SUSPENSION INTRAMUSCULAR at 09:21

## 2020-11-18 RX ADMIN — MEASLES, MUMPS, AND RUBELLA VIRUS VACCINE LIVE 0.5 ML: 1000; 12500; 1000 INJECTION, POWDER, LYOPHILIZED, FOR SUSPENSION SUBCUTANEOUS at 09:20

## 2020-11-18 RX ADMIN — CLONAZEPAM 0.5 MG: 0.5 TABLET ORAL at 09:32

## 2020-11-18 RX ADMIN — IBUPROFEN 800 MG: 400 TABLET ORAL at 05:22

## 2020-11-18 NOTE — DISCHARGE INSTRUCTIONS
POST DELIVERY DISCHARGE INSTRUCTIONS    Name: Kamari Osman  YOB: 2000  Primary Diagnosis: Active Problems:    Agoraphobia with panic disorder (11/14/2020)      IUP (intrauterine pregnancy), incidental (11/14/2020)      41 weeks gestation of pregnancy (11/14/2020)      Limited prenatal care, third trimester (11/14/2020)      Agoraphobia with panic attacks (11/15/2020)      Post-dates pregnancy (11/15/2020)      Limited prenatal care in third trimester (11/15/2020)      Anxiety disorder affecting pregnancy, antepartum (11/15/2020)        General:     Diet/Diet Restrictions:  Eight 8-ounce glasses of fluid daily (water, juices); avoid excessive caffeine intake. Meals/snacks as desired which are high in fiber and carbohydrates and low in fat and cholesterol. Physical Activity / Restrictions / Safety:     Avoid heavy lifting, no more that 8 lbs. For 2-3 weeks; limit use of stairs to 2 times daily for the first week home. No driving for two weeks. Avoid intercourse 4-6 weeks, no douching or tampon use. Check with obstetrician before starting or resuming an exercise program.         Discharge Instructions/Special Treatment/Home Care Needs:     Continue prenatal vitamins. Continue to use squirt bottle with warm water on your episiotomy after each bathroom use until bleeding stops. If steri-strips applied to your incision, remove in 7-10 days. Call your doctor for the following:     Fever over 101 degrees by mouth. Vaginal bleeding heavier than a normal menstrual period or clot larger than a golf ball. Red streaks or increased swelling of legs, painful red streaks on your breast.  Painful urination, constipation and increased pain or swelling or discharge with your incision. If you feel extremely anxious or overwhelmed. If you have thoughts of harming yourself and/or your baby.     Pain Management:     Pain Management:   Take Acetaminophen (Tylenol) or Ibuprofen (Advil, Motrin), as directed for pain. Use a warm Sitz bath 3 times daily to relieve episiotomy or hemorrhoidal discomfort. Heating pad to  incision as needed. For hemorrhoidal discomfort, use Tucks and Anusol cream as needed and directed. Follow-Up Care: These are general instructions for a healthy lifestyle:    No smoking/ No tobacco products/ Avoid exposure to second hand smoke    Surgeon General's Warning:  Quitting smoking now greatly reduces serious risk to your health. Obesity, smoking, and sedentary lifestyle greatly increases your risk for illness    A healthy diet, regular physical exercise & weight monitoring are important for maintaining a healthy lifestyle    Recognize signs and symptoms of STROKE:    F-face looks uneven    A-arms unable to move or move unevenly    S-speech slurred or non-existent    T-time-call 911 as soon as signs and symptoms begin-DO NOT go       Back to bed or wait to see if you get better-TIME IS BRAIN. Follow up with your OB in 2 weeks and 6 weeks.

## 2020-11-18 NOTE — PROGRESS NOTES
Bedside and Verbal shift change report given to GARRET Fay RN (oncoming nurse) by Pat León RN (offgoing nurse). Report included the following information SBAR, Kardex, OR Summary, Procedure Summary, Intake/Output, MAR and Recent Results. Mother feeding       Patient stating that she feels \"congestion\" in her chest. Lungs clear bilaterally. Patient encouraged to use incentive spirometer. Patient educated to splint incision with pillow and cough to clear any congestion.  Assisted with  diaper change. No other needs at this time, call bell within reach      Pharmacy paged in regards to medication not stocked in [facility]      Scheduled medications given at this time.  Shift assessment complete. Patient denies headache, visual disturbances, and right epigastic pain at this time. Breath sounds clear bilaterally. Patient is passing gas, bowel sounds active, with last BM being 20. Fundus firm at U with scant  lochia, no clots noted on assessment. Incision well approximated, steri-strips dry and intact. Incentive spirometer used at this time and patient educated to continue using 10 times every hour. No edema in upper extremities, +1 pitting in lower extremities. Patient free of clonus in lower extremities and denying any pain/tenderness behind knees or in calves. Patient rating pain 1/10 and scheduled Toradol given. Patient educated to notify nursing staff of: SOB, chest pain/heaviness, blurred vision, lightheadedness, increase in bleeding, and passing of clots, patient verbalized understanding.  No other needs expressed at this time, call bell within reach, visitor at bedside     0144 Rounding complete, patient resting with eyes closed, chest rise and fall noted upon visual assessment     0338 Rounding complete, patient resting with eyes closed, chest rise and fall noted upon visual assessment     0420 Rounding complete, patient resting with eyes closed, chest rise and fall noted upon visual assessment     0522 Motrin given as scheduledTylenol put patient over the max 4000 mg in 24 hour period     0524 Fundal completed, firm @ U with scant lochia    0617 Rounding complete, patient resting with eyes closed, chest rise and fall noted upon visual assessment     0710 Bedside and Verbal shift change report given to DAVID Briggs RN (oncoming nurse) by Alcira Fay RN (offgoing nurse). Report included the following information SBAR, Kardex, OR Summary, Procedure Summary, Intake/Output, MAR and Recent Results.

## 2020-11-18 NOTE — DISCHARGE SUMMARY
Obstetrical Discharge Summary     Name: Nini Santana MRN: [id number]  SSN: [id number]    YOB: 2000  Age: 23 y.o. Sex: female      Allergies: Patient has no known allergies. Admit Date: 2020    Discharge Date: 2020     Admitting Physician: Renetta Buckner MD     Attending Physician:  Kira Mcgrath MD     * Admission Diagnoses: IUP (intrauterine pregnancy), incidental [Z33.1]  41 weeks gestation of pregnancy [Z3A.41]  Agoraphobia with panic disorder [F40.01]  Limited prenatal care, third trimester [O09.33]  Post-dates pregnancy [O48.0]  Limited prenatal care in third trimester [O09.33]  Agoraphobia with panic attacks [F40.01]  Anxiety disorder affecting pregnancy, antepartum [O99.340, F41.9]    * Discharge Diagnoses:   Information for the patient's :  Gene Oconnor [[id number]]   Delivery of a 3.997 kg male infant via , Low Transverse on 2020 at 2:47 AM  by Aric Ny. Apgars were 4  and 9 .        Additional Diagnoses:   Hospital Problems as of 2020 Date Reviewed: 2020          Codes Class Noted - Resolved POA    Agoraphobia with panic attacks ICD-10-CM: F40.01  ICD-9-CM: 300.21  11/15/2020 - Present Unknown        Post-dates pregnancy ICD-10-CM: O48.0  ICD-9-CM: 645.10  11/15/2020 - Present Unknown        Limited prenatal care in third trimester ICD-10-CM: O09.33  ICD-9-CM: V23.7  11/15/2020 - Present Unknown        Anxiety disorder affecting pregnancy, antepartum ICD-10-CM: O99.340, F41.9  ICD-9-CM: 648.43, 300.00  11/15/2020 - Present Unknown        Agoraphobia with panic disorder ICD-10-CM: F40.01  ICD-9-CM: 300.21  2020 - Present Unknown        IUP (intrauterine pregnancy), incidental ICD-10-CM: Z33.1  ICD-9-CM: V22.2  2020 - Present Unknown        41 weeks gestation of pregnancy ICD-10-CM: Z3A.41  ICD-9-CM: 645.10  2020 - Present Unknown        Limited prenatal care, third trimester ICD-10-CM: O09.33  ICD-9-CM: V23.7  2020 - Present Unknown             Lab Results   Component Value Date/Time    ABO/Rh(D) A POSITIVE 2020 05:25 PM    Rubella, External NON immune 2020    GrBStrep, External positive 10/16/2020    ABO,Rh A positive 2020      Immunization History   Administered Date(s) Administered    Influenza Vaccine 10/02/2020    MMR 2020    Tdap 2020       * Procedures: Primary  Section  Procedure(s):   SECTION           * Discharge Condition: good    * Hospital Course: Normal hospital course following the delivery. * Disposition: Home    Discharge Medications:   Current Discharge Medication List      START taking these medications    Details   HYDROcodone-acetaminophen (Norco) 5-325 mg per tablet Take 1 Tab by mouth every six (6) hours as needed for Pain for up to 5 days. Max Daily Amount: 4 Tabs. Qty: 20 Tab, Refills: 0    Associated Diagnoses: IUP (intrauterine pregnancy), incidental         CONTINUE these medications which have NOT CHANGED    Details   mirtazapine (REMERON) 45 mg tablet Take 45 mg by mouth nightly. Indications: major depressive disorder, depression and anxiety      QUEtiapine (SEROQUEL) 50 mg tablet Take 100 mg by mouth nightly. Indications: repeated episodes of anxiety, agoraphobia with panic disorder      clonazePAM (KLONOPIN) 0.5 mg tablet Take 0.5 mg by mouth nightly as needed. citalopram (CELEXA) 10 mg tablet Take 10 mg by mouth daily. * Follow-up Care/Patient Instructions: Activity: No sex for 6 weeks, No driving while on analgesics and No heavy lifting for 2 weeks  Diet: Regular Diet  Wound Care: Keep wound clean and dry    Follow-up Information    Follow up with Dr. Karmen Ambriz in 2 weeks.             Everardo Brown MD  2020  12:38 PM

## 2020-11-18 NOTE — PROGRESS NOTES
OB/GYN Progress Note    POD # 2  Assessment:     S/p Primary C/S  Doing well    Plan:   Home today  Discharge home with standard precautions and return to clinic in 2 weeks. Subjective: The patient denies complaints. Pain is well controlled with current medications. Urinating without difficulty. The patient is ambulating well. The patient is tolerating a normal diet. decreased Lochia  Pt. is bottlefeeding. Pt. has passed flatus. Pt. has not had a BM. Objective:      Visit Vitals  /74 (BP 1 Location: Right arm, BP Patient Position: At rest)   Pulse 68   Temp 98.2 °F (36.8 °C)   Resp 17   Ht 5' 4\" (1.626 m)   Wt 68 kg (150 lb)   SpO2 99%   Breastfeeding Yes   BMI 25.75 kg/m²     No intake or output data in the 24 hours ending 11/18/20 1230     General:    no distress   CVS:   RRR   Lungs: Clear to auscultation   Abdomen:   Soft, NT, +BS   Incsion healing well, no significant drainage, no dehiscence, no significant erythema    Extremities:  No evidence of DVT seen on physical exam.. no edema          Labs:  No results found for this or any previous visit (from the past 24 hour(s)).         Yvonne Lima MD, MD 11/18/2020 12:30 PM

## 2020-11-18 NOTE — PROGRESS NOTES
0955  Bedside and Verbal shift change report given to DAVID Briggs RN (oncoming nurse) by [facility]. MICHELLE Fay (offgoing nurse). Report included the following information SBAR, Kardex, Intake/Output, MAR and Recent Results. 0820  Rounded on patient, no further needs at this time. 0930  Completed assessment. Gave TDAP vaccine, MMR vaccine, and scheduled klonopin. No further needs at this time. 21   Paged Dr. Louie Vazquez. 1120  Paged Dr. Louie Vazquez. 1130  Gave scheduled tylenol. Completed quick disclosure and education. Patient verbalized understanding and had no questions. Patient wants to be discharged, paged DEANA Sotelo MD again. 65  Dr. Louie Vazquez called and advised patient could have abdominal binder and that he'd be in to see her in a couple of hours. 1230  Reassessed pain, no further needs at this time. 1300  Dr. Rodríguez Urban gave telephone discharge order. 1300  Gave AVS and patient e-signed. Patient ready for discharge.

## 2020-11-18 NOTE — PROGRESS NOTES
Problem: Patient Education: Go to Patient Education Activity  Goal: Patient/Family Education  Outcome: Progressing Towards Goal     Problem: Pressure Injury - Risk of  Goal: *Prevention of pressure injury  Description: Document Rishi Scale and appropriate interventions in the flowsheet.   Outcome: Progressing Towards Goal  Note: Pressure Injury Interventions:  Sensory Interventions: Keep linens dry and wrinkle-free, Minimize linen layers    Moisture Interventions: Absorbent underpads    Activity Interventions: Pressure redistribution bed/mattress(bed type)    Mobility Interventions: Pressure redistribution bed/mattress (bed type)    Nutrition Interventions: Document food/fluid/supplement intake                     Problem: Patient Education: Go to Patient Education Activity  Goal: Patient/Family Education  Outcome: Progressing Towards Goal     Problem: Patient Education: Go to Patient Education Activity  Goal: Patient/Family Education  Outcome: Progressing Towards Goal     Problem:  Delivery: Postpartum Day 1  Goal: Activity/Safety  Outcome: Progressing Towards Goal  Goal: Diagnostic Test/Procedures  Outcome: Progressing Towards Goal  Goal: Medications  Outcome: Progressing Towards Goal  Goal: Respiratory  Outcome: Progressing Towards Goal  Goal: Treatments/Interventions/Procedures  Outcome: Progressing Towards Goal  Goal: Psychosocial  Outcome: Progressing Towards Goal  Goal: *Vital signs within defined limits  Outcome: Progressing Towards Goal  Goal: *Labs within defined limits  Outcome: Progressing Towards Goal  Goal: *Hemodynamically stable  Outcome: Progressing Towards Goal  Goal: *Optimal pain control at patient's stated goal  Outcome: Progressing Towards Goal  Goal: *Participates in infant care  Outcome: Progressing Towards Goal  Goal: *Demonstrates progressive activity  Outcome: Progressing Towards Goal     Problem: Pain  Goal: *Control of Pain  Outcome: Progressing Towards Goal     Problem: Patient Education: Go to Patient Education Activity  Goal: Patient/Family Education  Outcome: Progressing Towards Goal

## 2023-01-01 NOTE — PROGRESS NOTES
0720: Bedside and Verbal shift change report given to Be Camilo RN (oncoming nurse) by SUNDAY Herrera RN (offgoing nurse). Report included the following information SBAR, Kardex, Procedure Summary, Intake/Output, MAR and Recent Results. 0940: Shift assessment complete. Patient denies headache, visual disturbances, and right epigastic pain at this time. Breath sounds clear bilaterally. Patient is passing gas, bowel sounds active, with last BM being 11/17/20. Fundus firm at U with scant lochia, no clots noted on assessment. Incision is clean, dry, and intact. Incentive spirometer used at this time and patient educated to continue using 10 times every hour. IV site is a 20 gauge in the right hand. IV site reinforced with tape. No edema in upper extremities, trace in lower extremities. Patient free of clonus in lower extremities and denying any pain/tenderness behind knees or in calves. Patient rating pain 4/10 and no pain medication at this time. Patient given warm pack and placed on abdomen. Patient educated to notify nursing staff of: SOB, chest pain/heaviness, blurred vision, lightheadedness, increase in bleeding, and passing of clots, patient verbalized understanding. 1040: Pain reassessment completed. Pain level 3/10 with no other needs at this time. 1109: Scheduled Tylenol given. Pain level 3/10. No additional needs expressed. 1231: Rounds completed for scheduled Toradol. Patient in shower. Will return when patient is back in bed.     1315: Patient's IV came out in shower. Schedule Toradol held and discontinued; Motrin ordered. 1410: Circumcision education given to patient, father and grandmother of baby. No other needs at this time. 1506: Motrin pain medication given for 6/10 pain in abdomen. 1554: Throat lozenge given for sore throat. Assessment completed. Fundus firm at U with scant bleeding. Edema increased to +1 pitting edema in lower extremities. Swaddle education given to mother. mother/father Pain level 2/10 with no additional needs expressed. 1809: Schedule Tylenol given. No other needs expressed at this time. 1915: Bedside and Verbal shift change report given to GARRET Fay RN (oncoming nurse) by Faye Chaudhary RN (offgoing nurse). Report included the following information SBAR, Kardex, Procedure Summary, Intake/Output, MAR and Recent Results.

## (undated) DEVICE — GARMENT,MEDLINE,DVT,INT,CALF,MED, GEN2: Brand: MEDLINE

## (undated) DEVICE — INTENDED FOR TISSUE SEPARATION, AND OTHER PROCEDURES THAT REQUIRE A SHARP SURGICAL BLADE TO PUNCTURE OR CUT.: Brand: BARD-PARKER ® CARBON RIB-BACK BLADES

## (undated) DEVICE — SUT MONOCRYL PLUS UD 4-0 --

## (undated) DEVICE — SUT MCRYL + 3-0 27IN PS1 UD --

## (undated) DEVICE — 3L THIN WALL CAN: Brand: CRD

## (undated) DEVICE — SUT VCRL + 0 36IN CT1 UD --

## (undated) DEVICE — DERMABOND SKIN ADH 0.7ML --

## (undated) DEVICE — PACK PROCEDURE SURG BIRTH

## (undated) DEVICE — HEX-LOCKING BLADE ELECTRODE: Brand: EDGE

## (undated) DEVICE — STRAP,POSITIONING,KNEE/BODY,FOAM,4X60": Brand: MEDLINE

## (undated) DEVICE — SUT VCRL + 2-0 36IN CT1 UD --

## (undated) DEVICE — SUTURE VCRL SZ 0 L36IN ABSRB UD L36MM CT-1 1/2 CIR J946H

## (undated) DEVICE — REM POLYHESIVE ADULT PATIENT RETURN ELECTRODE: Brand: VALLEYLAB

## (undated) DEVICE — SOL IRRIGATION INJ NACL 0.9% 500ML BTL